# Patient Record
Sex: FEMALE | Race: WHITE | NOT HISPANIC OR LATINO | Employment: OTHER | URBAN - METROPOLITAN AREA
[De-identification: names, ages, dates, MRNs, and addresses within clinical notes are randomized per-mention and may not be internally consistent; named-entity substitution may affect disease eponyms.]

---

## 2023-09-21 ENCOUNTER — OFFICE VISIT (OUTPATIENT)
Dept: PRIMARY CARE | Facility: CLINIC | Age: 73
End: 2023-09-21
Payer: MEDICARE

## 2023-09-21 VITALS
OXYGEN SATURATION: 97 % | WEIGHT: 179.9 LBS | DIASTOLIC BLOOD PRESSURE: 80 MMHG | HEART RATE: 62 BPM | SYSTOLIC BLOOD PRESSURE: 142 MMHG | TEMPERATURE: 98 F | BODY MASS INDEX: 29.94 KG/M2

## 2023-09-21 DIAGNOSIS — Z11.59 NEED FOR HEPATITIS C SCREENING TEST: ICD-10-CM

## 2023-09-21 DIAGNOSIS — F41.1 GENERALIZED ANXIETY DISORDER: ICD-10-CM

## 2023-09-21 DIAGNOSIS — I10 BENIGN ESSENTIAL HYPERTENSION: ICD-10-CM

## 2023-09-21 DIAGNOSIS — Z12.31 ENCOUNTER FOR SCREENING MAMMOGRAM FOR BREAST CANCER: ICD-10-CM

## 2023-09-21 DIAGNOSIS — E78.2 MIXED HYPERLIPIDEMIA: ICD-10-CM

## 2023-09-21 DIAGNOSIS — Z23 NEED FOR VACCINATION: ICD-10-CM

## 2023-09-21 DIAGNOSIS — Z00.00 ROUTINE GENERAL MEDICAL EXAMINATION AT HEALTH CARE FACILITY: Primary | ICD-10-CM

## 2023-09-21 DIAGNOSIS — K21.9 GASTROESOPHAGEAL REFLUX DISEASE WITHOUT ESOPHAGITIS: ICD-10-CM

## 2023-09-21 DIAGNOSIS — I25.10 ATHEROSCLEROSIS OF NATIVE CORONARY ARTERY OF NATIVE HEART WITHOUT ANGINA PECTORIS: ICD-10-CM

## 2023-09-21 DIAGNOSIS — Z78.0 ASYMPTOMATIC MENOPAUSAL STATE: ICD-10-CM

## 2023-09-21 PROBLEM — E78.5 HYPERLIPIDEMIA: Status: ACTIVE | Noted: 2023-09-21

## 2023-09-21 PROCEDURE — G0008 ADMIN INFLUENZA VIRUS VAC: HCPCS | Performed by: INTERNAL MEDICINE

## 2023-09-21 PROCEDURE — G0439 PPPS, SUBSEQ VISIT: HCPCS | Performed by: INTERNAL MEDICINE

## 2023-09-21 PROCEDURE — 3079F DIAST BP 80-89 MM HG: CPT | Performed by: INTERNAL MEDICINE

## 2023-09-21 PROCEDURE — 1170F FXNL STATUS ASSESSED: CPT | Performed by: INTERNAL MEDICINE

## 2023-09-21 PROCEDURE — 1036F TOBACCO NON-USER: CPT | Performed by: INTERNAL MEDICINE

## 2023-09-21 PROCEDURE — 1160F RVW MEDS BY RX/DR IN RCRD: CPT | Performed by: INTERNAL MEDICINE

## 2023-09-21 PROCEDURE — 90662 IIV NO PRSV INCREASED AG IM: CPT | Performed by: INTERNAL MEDICINE

## 2023-09-21 PROCEDURE — 1159F MED LIST DOCD IN RCRD: CPT | Performed by: INTERNAL MEDICINE

## 2023-09-21 PROCEDURE — 99214 OFFICE O/P EST MOD 30 MIN: CPT | Performed by: INTERNAL MEDICINE

## 2023-09-21 PROCEDURE — 3077F SYST BP >= 140 MM HG: CPT | Performed by: INTERNAL MEDICINE

## 2023-09-21 RX ORDER — IBUPROFEN 100 MG/5ML
1 SUSPENSION, ORAL (FINAL DOSE FORM) ORAL DAILY
COMMUNITY
Start: 2014-06-27

## 2023-09-21 RX ORDER — BIOTIN 10 MG
1 TABLET ORAL DAILY
COMMUNITY
Start: 2017-01-27

## 2023-09-21 RX ORDER — ZINC SULFATE 50(220)MG
CAPSULE ORAL
COMMUNITY

## 2023-09-21 RX ORDER — BUSPIRONE HYDROCHLORIDE 15 MG/1
15 TABLET ORAL EVERY 12 HOURS
COMMUNITY
End: 2024-01-02

## 2023-09-21 RX ORDER — VIT C/E/ZN/COPPR/LUTEIN/ZEAXAN 250MG-90MG
25 CAPSULE ORAL
COMMUNITY

## 2023-09-21 RX ORDER — ZINC GLUCONATE 50 MG
TABLET ORAL
COMMUNITY
Start: 2017-09-26

## 2023-09-21 RX ORDER — NEOMYCIN SULFATE, POLYMYXIN B SULFATE, AND DEXAMETHASONE 3.5; 10000; 1 MG/G; [USP'U]/G; MG/G
OINTMENT OPHTHALMIC
COMMUNITY
Start: 2021-08-31

## 2023-09-21 RX ORDER — ROSUVASTATIN CALCIUM 10 MG/1
10 TABLET, COATED ORAL DAILY
COMMUNITY
Start: 2017-03-07 | End: 2024-03-27

## 2023-09-21 RX ORDER — LUTEIN 6 MG
1 TABLET ORAL DAILY
COMMUNITY
Start: 2017-01-27

## 2023-09-21 RX ORDER — ZOLPIDEM TARTRATE 10 MG/1
10 TABLET ORAL
COMMUNITY
Start: 2019-03-13 | End: 2023-10-10 | Stop reason: SDUPTHER

## 2023-09-21 RX ORDER — FLUTICASONE PROPIONATE 50 MCG
SPRAY, SUSPENSION (ML) NASAL
COMMUNITY
Start: 2023-09-18 | End: 2023-10-17 | Stop reason: ALTCHOICE

## 2023-09-21 RX ORDER — ROYAL JELLY 500 MG
CAPSULE ORAL 2 TIMES DAILY
COMMUNITY
Start: 2017-09-11

## 2023-09-21 RX ORDER — PANTOPRAZOLE SODIUM 40 MG/1
40 TABLET, DELAYED RELEASE ORAL
COMMUNITY
Start: 2016-10-21 | End: 2023-10-31 | Stop reason: SDUPTHER

## 2023-09-21 RX ORDER — LOSARTAN POTASSIUM 50 MG/1
50 TABLET ORAL DAILY
COMMUNITY
Start: 2014-06-27 | End: 2024-03-18

## 2023-09-21 ASSESSMENT — ACTIVITIES OF DAILY LIVING (ADL)
TAKING_MEDICATION: INDEPENDENT
GROCERY_SHOPPING: INDEPENDENT
DOING_HOUSEWORK: INDEPENDENT
DRESSING: INDEPENDENT
BATHING: INDEPENDENT
MANAGING_FINANCES: INDEPENDENT

## 2023-09-21 ASSESSMENT — PATIENT HEALTH QUESTIONNAIRE - PHQ9
2. FEELING DOWN, DEPRESSED OR HOPELESS: NOT AT ALL
1. LITTLE INTEREST OR PLEASURE IN DOING THINGS: NOT AT ALL
SUM OF ALL RESPONSES TO PHQ9 QUESTIONS 1 AND 2: 0

## 2023-09-21 NOTE — ASSESSMENT & PLAN NOTE
Blood pressure is not at goal.  She will monitor at home before medication changes.  DASH diet to be continued.  Follow-up in 4 weeks

## 2023-09-21 NOTE — PROGRESS NOTES
Subjective   Reason for Visit: Sruthi Sanders is an 72 y.o. female here for a Medicare Wellness visit.          Reviewed all medications by prescribing practitioner or clinical pharmacist (such as prescriptions, OTCs, herbal therapies and supplements) and documented in the medical record.    HPI    Patient Care Team:  Gretchen Morales MD as PCP - General  Gretchen Morales MD as PCP - [employer] PCP     Review of Systems    Objective   Vitals:  /80   Pulse 62   Temp 36.7 °C (98 °F)   Wt 81.6 kg (179 lb 14.4 oz)   SpO2 97%   BMI 29.94 kg/m²       Physical Exam    Assessment/Plan   Problem List Items Addressed This Visit       Benign essential hypertension    Current Assessment & Plan     Blood pressure is not at goal.  She will monitor at home before medication changes.  DASH diet to be continued.  Follow-up in 4 weeks         Coronary atherosclerosis - Primary    Current Assessment & Plan     Cardiac status is stable.  Continue medical management         Relevant Orders    CBC and Auto Differential    Comprehensive Metabolic Panel    Lipid Panel    Generalized anxiety disorder    Current Assessment & Plan     Anxiety symptoms fairly controlled.  Some days need additional half dose         GERD (gastroesophageal reflux disease)    Current Assessment & Plan     GERD controlled with PPI 3 times a week.  Continue same plan         Hyperlipidemia    Current Assessment & Plan     Check lipids and liver enzymes today.  Continue diet control and medication

## 2023-09-21 NOTE — PROGRESS NOTES
Subjective   Reason for Visit: Sruthi Sanders is an 72 y.o. female here for a Medicare Wellness visit.   And follow up  Taking  blood pressure medications.  Denies headache, dizziness, chest pain, shortness of breath or palpitation.  No exertional chest pain or dizziness or palpitation.  Has had eye exam within 1 year.  No blurred vision or double vision  Exercising  Following DASH diet      No angina  Edema stable  Some discoloration lower leg left  Anxiety controlled.some times need additional half elen  Gerd controlled  Needs ambien to sleep.no issues    Reviewed all medications by prescribing practitioner or clinical pharmacist (such as prescriptions, OTCs, herbal therapies and supplements) and documented in the medical record.    HPI    Patient Care Team:  Gretchen Morales MD as PCP - General  Gretchen Morales MD as PCP - [employer] PCP     Review of Systems    Objective   Vitals:  /80   Pulse 62   Temp 36.7 °C (98 °F)   Wt 81.6 kg (179 lb 14.4 oz)   SpO2 97%   BMI 29.94 kg/m²       Physical Exam  Constitutional:       Appearance: Normal appearance.   HENT:      Head: Normocephalic.   Eyes:      General: No scleral icterus.     Conjunctiva/sclera: Conjunctivae normal.   Cardiovascular:      Rate and Rhythm: Normal rate and regular rhythm.      Pulses: Normal pulses.      Heart sounds: No murmur heard.  Pulmonary:      Effort: Pulmonary effort is normal.      Breath sounds: Normal breath sounds.   Abdominal:      General: There is no distension.      Palpations: There is no mass.      Tenderness: There is no abdominal tenderness.   Neurological:      Mental Status: She is alert.   Psychiatric:         Mood and Affect: Mood normal.         Assessment/Plan   Problem List Items Addressed This Visit       Benign essential hypertension    Current Assessment & Plan     Blood pressure is not at goal.  She will monitor at home before medication changes.  DASH diet to be continued.  Follow-up  in 4 weeks         Coronary atherosclerosis    Current Assessment & Plan     Cardiac status is stable.  Continue medical management         Relevant Orders    CBC and Auto Differential    Comprehensive Metabolic Panel    Lipid Panel    Generalized anxiety disorder    Current Assessment & Plan     Anxiety symptoms fairly controlled.  Some days need additional half dose         GERD (gastroesophageal reflux disease)    Current Assessment & Plan     GERD controlled with PPI 3 times a week.  Continue same plan         Hyperlipidemia    Current Assessment & Plan     Check lipids and liver enzymes today.  Continue diet control and medication          Other Visit Diagnoses       Routine general medical examination at health care facility    -  Primary    Need for vaccination        Relevant Orders    Flu vaccine, high dose seasonal, preservative free    Asymptomatic menopausal state        Relevant Orders    XR DEXA bone density    Encounter for screening mammogram for breast cancer        Relevant Orders    BI mammo bilateral screening tomosynthesis    Need for hepatitis C screening test        Relevant Orders    Hepatitis C antibody

## 2023-10-10 ENCOUNTER — TELEPHONE (OUTPATIENT)
Dept: PRIMARY CARE | Facility: CLINIC | Age: 73
End: 2023-10-10

## 2023-10-10 DIAGNOSIS — F51.01 PRIMARY INSOMNIA: Primary | ICD-10-CM

## 2023-10-10 PROBLEM — G47.00 INSOMNIA: Status: ACTIVE | Noted: 2023-10-10

## 2023-10-10 RX ORDER — ZOLPIDEM TARTRATE 10 MG/1
10 TABLET ORAL NIGHTLY PRN
Qty: 90 TABLET | Refills: 0 | Status: SHIPPED | OUTPATIENT
Start: 2023-10-10 | End: 2024-01-02

## 2023-10-10 NOTE — TELEPHONE ENCOUNTER
Rx Refill Request Telephone Encounter    Name:  Sruthi Sanders  :  [id number]  Medication Name:  Zolpidem 10 mg tablet   Dose :    Route : oral  Quantity : 90   Directions :    Specific Pharmacy location:  [facility]   Date of last appointment:  23  Date of next appointment:  10/17/23  Best number to reach patient:

## 2023-10-15 PROBLEM — K43.9 VENTRAL HERNIA: Status: ACTIVE | Noted: 2023-10-15

## 2023-10-15 PROBLEM — I83.90 VARICOSE VEIN OF LEG: Status: ACTIVE | Noted: 2023-10-15

## 2023-10-15 PROBLEM — R94.31 ABNORMAL EKG: Status: ACTIVE | Noted: 2023-10-15

## 2023-10-15 PROBLEM — H01.009 BLEPHARITIS: Status: ACTIVE | Noted: 2023-10-15

## 2023-10-15 PROBLEM — N95.1 MENOPAUSAL SYNDROME (HOT FLASHES): Status: ACTIVE | Noted: 2023-10-15

## 2023-10-15 PROBLEM — M19.90 OSTEOARTHRITIS: Status: ACTIVE | Noted: 2023-10-15

## 2023-10-15 PROBLEM — I83.893 VARICOSE VEINS OF BILATERAL LOWER EXTREMITIES WITH OTHER COMPLICATIONS: Status: ACTIVE | Noted: 2023-10-15

## 2023-10-15 PROBLEM — Z79.899 MEDICATION MANAGEMENT: Status: ACTIVE | Noted: 2023-10-15

## 2023-10-15 RX ORDER — NAPROXEN SODIUM 220 MG/1
81 TABLET, FILM COATED ORAL DAILY
COMMUNITY

## 2023-10-17 ENCOUNTER — LAB (OUTPATIENT)
Dept: LAB | Facility: LAB | Age: 73
End: 2023-10-17
Payer: MEDICARE

## 2023-10-17 ENCOUNTER — OFFICE VISIT (OUTPATIENT)
Dept: PRIMARY CARE | Facility: CLINIC | Age: 73
End: 2023-10-17
Payer: MEDICARE

## 2023-10-17 VITALS
OXYGEN SATURATION: 98 % | WEIGHT: 181.6 LBS | TEMPERATURE: 97.2 F | DIASTOLIC BLOOD PRESSURE: 81 MMHG | SYSTOLIC BLOOD PRESSURE: 137 MMHG | HEART RATE: 57 BPM | BODY MASS INDEX: 30.22 KG/M2

## 2023-10-17 DIAGNOSIS — I10 BENIGN ESSENTIAL HYPERTENSION: Primary | ICD-10-CM

## 2023-10-17 DIAGNOSIS — Z11.59 NEED FOR HEPATITIS C SCREENING TEST: ICD-10-CM

## 2023-10-17 DIAGNOSIS — F41.1 GENERALIZED ANXIETY DISORDER: ICD-10-CM

## 2023-10-17 DIAGNOSIS — I25.10 ATHEROSCLEROSIS OF NATIVE CORONARY ARTERY OF NATIVE HEART WITHOUT ANGINA PECTORIS: ICD-10-CM

## 2023-10-17 LAB
ALBUMIN SERPL BCP-MCNC: 4 G/DL (ref 3.4–5)
ALP SERPL-CCNC: 55 U/L (ref 33–136)
ALT SERPL W P-5'-P-CCNC: 18 U/L (ref 7–45)
ANION GAP SERPL CALC-SCNC: 11 MMOL/L (ref 10–20)
AST SERPL W P-5'-P-CCNC: 21 U/L (ref 9–39)
BASOPHILS # BLD AUTO: 0.06 X10*3/UL (ref 0–0.1)
BASOPHILS NFR BLD AUTO: 1.1 %
BILIRUB SERPL-MCNC: 0.5 MG/DL (ref 0–1.2)
BUN SERPL-MCNC: 15 MG/DL (ref 6–23)
CALCIUM SERPL-MCNC: 9.5 MG/DL (ref 8.6–10.6)
CHLORIDE SERPL-SCNC: 102 MMOL/L (ref 98–107)
CHOLEST SERPL-MCNC: 136 MG/DL (ref 0–199)
CHOLESTEROL/HDL RATIO: 1.9
CO2 SERPL-SCNC: 30 MMOL/L (ref 21–32)
CREAT SERPL-MCNC: 0.84 MG/DL (ref 0.5–1.05)
EOSINOPHIL # BLD AUTO: 0.32 X10*3/UL (ref 0–0.4)
EOSINOPHIL NFR BLD AUTO: 5.6 %
ERYTHROCYTE [DISTWIDTH] IN BLOOD BY AUTOMATED COUNT: 14.1 % (ref 11.5–14.5)
GFR SERPL CREATININE-BSD FRML MDRD: 73 ML/MIN/1.73M*2
GLUCOSE SERPL-MCNC: 89 MG/DL (ref 74–99)
HCT VFR BLD AUTO: 39.2 % (ref 36–46)
HCV AB SER QL: NONREACTIVE
HDLC SERPL-MCNC: 70.7 MG/DL
HGB BLD-MCNC: 12.3 G/DL (ref 12–16)
IMM GRANULOCYTES # BLD AUTO: 0.01 X10*3/UL (ref 0–0.5)
IMM GRANULOCYTES NFR BLD AUTO: 0.2 % (ref 0–0.9)
LDLC SERPL CALC-MCNC: 51 MG/DL
LYMPHOCYTES # BLD AUTO: 1.39 X10*3/UL (ref 0.8–3)
LYMPHOCYTES NFR BLD AUTO: 24.4 %
MCH RBC QN AUTO: 30.7 PG (ref 26–34)
MCHC RBC AUTO-ENTMCNC: 31.4 G/DL (ref 32–36)
MCV RBC AUTO: 98 FL (ref 80–100)
MONOCYTES # BLD AUTO: 0.43 X10*3/UL (ref 0.05–0.8)
MONOCYTES NFR BLD AUTO: 7.6 %
NEUTROPHILS # BLD AUTO: 3.48 X10*3/UL (ref 1.6–5.5)
NEUTROPHILS NFR BLD AUTO: 61.1 %
NON HDL CHOLESTEROL: 65 MG/DL (ref 0–149)
NRBC BLD-RTO: 0 /100 WBCS (ref 0–0)
PLATELET # BLD AUTO: 228 X10*3/UL (ref 150–450)
PMV BLD AUTO: 11 FL (ref 7.5–11.5)
POTASSIUM SERPL-SCNC: 4.4 MMOL/L (ref 3.5–5.3)
PROT SERPL-MCNC: 6.3 G/DL (ref 6.4–8.2)
RBC # BLD AUTO: 4.01 X10*6/UL (ref 4–5.2)
SODIUM SERPL-SCNC: 139 MMOL/L (ref 136–145)
TRIGL SERPL-MCNC: 71 MG/DL (ref 0–149)
VLDL: 14 MG/DL (ref 0–40)
WBC # BLD AUTO: 5.7 X10*3/UL (ref 4.4–11.3)

## 2023-10-17 PROCEDURE — 80053 COMPREHEN METABOLIC PANEL: CPT

## 2023-10-17 PROCEDURE — 85025 COMPLETE CBC W/AUTO DIFF WBC: CPT

## 2023-10-17 PROCEDURE — 86803 HEPATITIS C AB TEST: CPT

## 2023-10-17 PROCEDURE — 1160F RVW MEDS BY RX/DR IN RCRD: CPT | Performed by: INTERNAL MEDICINE

## 2023-10-17 PROCEDURE — 80061 LIPID PANEL: CPT

## 2023-10-17 PROCEDURE — 99213 OFFICE O/P EST LOW 20 MIN: CPT | Performed by: INTERNAL MEDICINE

## 2023-10-17 PROCEDURE — 3079F DIAST BP 80-89 MM HG: CPT | Performed by: INTERNAL MEDICINE

## 2023-10-17 PROCEDURE — 1036F TOBACCO NON-USER: CPT | Performed by: INTERNAL MEDICINE

## 2023-10-17 PROCEDURE — 3075F SYST BP GE 130 - 139MM HG: CPT | Performed by: INTERNAL MEDICINE

## 2023-10-17 PROCEDURE — 1159F MED LIST DOCD IN RCRD: CPT | Performed by: INTERNAL MEDICINE

## 2023-10-17 PROCEDURE — 36415 COLL VENOUS BLD VENIPUNCTURE: CPT

## 2023-10-17 RX ORDER — HYDROCHLOROTHIAZIDE 12.5 MG/1
12.5 TABLET ORAL DAILY
Qty: 30 TABLET | Refills: 5 | Status: SHIPPED | OUTPATIENT
Start: 2023-10-17 | End: 2024-04-16 | Stop reason: SINTOL

## 2023-10-17 ASSESSMENT — PATIENT HEALTH QUESTIONNAIRE - PHQ9
1. LITTLE INTEREST OR PLEASURE IN DOING THINGS: NOT AT ALL
SUM OF ALL RESPONSES TO PHQ9 QUESTIONS 1 AND 2: 0
2. FEELING DOWN, DEPRESSED OR HOPELESS: NOT AT ALL

## 2023-10-17 NOTE — PROGRESS NOTES
Subjective   Patient ID: Sruthi Sanders is a 72 y.o. female who presents for Follow-up (BP check.).    HPI   Patient is here to follow-up on blood pressure.  Some days are higher and some days normal.  Started using  compression socks and edema is less  Anxiety is not well controlled on current treatment of BuSpar and she is trying 40 mg daily.  Feeling better  May need a refill earlier  Review of Systems  No headache or dizziness  No chest pain or shortness of breath  No PND or orthopnea  Objective   /81   Pulse 57   Temp 36.2 °C (97.2 °F)   Wt 82.4 kg (181 lb 9.6 oz)   SpO2 98%   BMI 30.22 kg/m²     Physical Exam  In NAD  No pallor or icterus  Neck supple  Chest is clear  CVS: S1-S2 regular rate and rhythm no murmur  Extremities trace edema on left  Mood and affect normal  Assessment/Plan   Problem List Items Addressed This Visit             ICD-10-CM    Benign essential hypertension - Primary I10    Relevant Medications    hydroCHLOROthiazide (HYDRODiuril) 12.5 mg tablet    Generalized anxiety disorder F41.1     Since BP is sometimes high and patient having lower extremity edema we will start low-dose diuretic.  Continue compression socks  Continue losartan    Continue BuSpar at 40 mg.  Call me for refills

## 2023-10-19 ENCOUNTER — APPOINTMENT (OUTPATIENT)
Dept: PRIMARY CARE | Facility: CLINIC | Age: 73
End: 2023-10-19
Payer: MEDICARE

## 2023-10-31 DIAGNOSIS — K21.9 GASTROESOPHAGEAL REFLUX DISEASE WITHOUT ESOPHAGITIS: Primary | ICD-10-CM

## 2023-10-31 RX ORDER — PANTOPRAZOLE SODIUM 40 MG/1
TABLET, DELAYED RELEASE ORAL
Qty: 36 TABLET | Refills: 3 | Status: SHIPPED | OUTPATIENT
Start: 2023-10-31

## 2024-04-01 DIAGNOSIS — F51.01 PRIMARY INSOMNIA: ICD-10-CM

## 2024-04-01 DIAGNOSIS — F41.1 GENERALIZED ANXIETY DISORDER: ICD-10-CM

## 2024-04-01 RX ORDER — ZOLPIDEM TARTRATE 10 MG/1
10 TABLET ORAL NIGHTLY PRN
Qty: 90 TABLET | Refills: 1 | Status: SHIPPED | OUTPATIENT
Start: 2024-04-01 | End: 2024-09-28

## 2024-04-01 RX ORDER — BUSPIRONE HYDROCHLORIDE 15 MG/1
15 TABLET ORAL EVERY 12 HOURS
Qty: 180 TABLET | Refills: 1 | Status: SHIPPED | OUTPATIENT
Start: 2024-04-01

## 2024-04-16 ENCOUNTER — TELEPHONE (OUTPATIENT)
Dept: PRIMARY CARE | Facility: CLINIC | Age: 74
End: 2024-04-16

## 2024-04-16 ENCOUNTER — OFFICE VISIT (OUTPATIENT)
Dept: PRIMARY CARE | Facility: CLINIC | Age: 74
End: 2024-04-16
Payer: MEDICARE

## 2024-04-16 VITALS
HEART RATE: 64 BPM | BODY MASS INDEX: 30.47 KG/M2 | TEMPERATURE: 97.6 F | DIASTOLIC BLOOD PRESSURE: 73 MMHG | OXYGEN SATURATION: 98 % | WEIGHT: 182.9 LBS | HEIGHT: 65 IN | SYSTOLIC BLOOD PRESSURE: 119 MMHG

## 2024-04-16 DIAGNOSIS — E78.2 MIXED HYPERLIPIDEMIA: ICD-10-CM

## 2024-04-16 DIAGNOSIS — I10 BENIGN ESSENTIAL HYPERTENSION: Primary | ICD-10-CM

## 2024-04-16 DIAGNOSIS — F41.1 GENERALIZED ANXIETY DISORDER: ICD-10-CM

## 2024-04-16 DIAGNOSIS — F51.01 PRIMARY INSOMNIA: ICD-10-CM

## 2024-04-16 DIAGNOSIS — K21.9 GASTROESOPHAGEAL REFLUX DISEASE WITHOUT ESOPHAGITIS: ICD-10-CM

## 2024-04-16 PROCEDURE — 1158F ADVNC CARE PLAN TLK DOCD: CPT | Performed by: INTERNAL MEDICINE

## 2024-04-16 PROCEDURE — 1159F MED LIST DOCD IN RCRD: CPT | Performed by: INTERNAL MEDICINE

## 2024-04-16 PROCEDURE — 1123F ACP DISCUSS/DSCN MKR DOCD: CPT | Performed by: INTERNAL MEDICINE

## 2024-04-16 PROCEDURE — 1036F TOBACCO NON-USER: CPT | Performed by: INTERNAL MEDICINE

## 2024-04-16 PROCEDURE — 3074F SYST BP LT 130 MM HG: CPT | Performed by: INTERNAL MEDICINE

## 2024-04-16 PROCEDURE — 99213 OFFICE O/P EST LOW 20 MIN: CPT | Performed by: INTERNAL MEDICINE

## 2024-04-16 PROCEDURE — 1160F RVW MEDS BY RX/DR IN RCRD: CPT | Performed by: INTERNAL MEDICINE

## 2024-04-16 PROCEDURE — G2211 COMPLEX E/M VISIT ADD ON: HCPCS | Performed by: INTERNAL MEDICINE

## 2024-04-16 PROCEDURE — 3078F DIAST BP <80 MM HG: CPT | Performed by: INTERNAL MEDICINE

## 2024-04-16 ASSESSMENT — ENCOUNTER SYMPTOMS
SHORTNESS OF BREATH: 0
ORTHOPNEA: 0
PND: 0
HYPERTENSION: 1
SWEATS: 0
BLURRED VISION: 0
HEADACHES: 0
PALPITATIONS: 0

## 2024-04-16 ASSESSMENT — PATIENT HEALTH QUESTIONNAIRE - PHQ9
1. LITTLE INTEREST OR PLEASURE IN DOING THINGS: NOT AT ALL
2. FEELING DOWN, DEPRESSED OR HOPELESS: NOT AT ALL
SUM OF ALL RESPONSES TO PHQ9 QUESTIONS 1 AND 2: 0

## 2024-04-16 NOTE — TELEPHONE ENCOUNTER
Pt was seen today. She scheduled her next visit in October, so if you want to put in labs now,  she will go get them a few days before hand.

## 2024-04-16 NOTE — PROGRESS NOTES
"Subjective   Patient ID: Sruthi Sanders is a 73 y.o. female who presents for Follow-up.    Hypertension  This is a chronic problem. The current episode started more than 1 year ago. The problem has been resolved since onset. The problem is controlled. Pertinent negatives include no anxiety, blurred vision, chest pain, headaches, malaise/fatigue, orthopnea, palpitations, peripheral edema, PND, shortness of breath or sweats. There are no associated agents to hypertension. Risk factors for coronary artery disease include family history, obesity, post-menopausal state and stress.      Taking  blood pressure medications.  Denies headache, dizziness, chest pain, shortness of breath or palpitation.  No exertional chest pain or dizziness or palpitation.  Has had eye exam within 1 year.  No blurred vision or double vision  Exercising now  Following DASH diet   No angina  Edema stable  Some discoloration lower leg left    Anxiety controlled.some times need additional half elen  Gerd controlled  Needs ambien to sleep.no issues.takes it daily  Had mammo and dexa    Tolerates statin.no myalgias.  Review of Systems   Constitutional:  Negative for malaise/fatigue.   Eyes:  Negative for blurred vision.   Respiratory:  Negative for shortness of breath.    Cardiovascular:  Negative for chest pain, palpitations, orthopnea and PND.   Neurological:  Negative for headaches.     Constitutional: not feeling poorly, no fever and not feeling tired.   ENT: no swollen glands in the neck.   Cardiovascular: no chest pain, no shortness of breath, no palpitations and no lower extremity edema.   Respiratory: no cough.   Gastrointestinal: no constipation and no nausea.      Objective   /73   Pulse 64   Temp 36.4 °C (97.6 °F)   Ht 1.651 m (5' 5\")   Wt 83 kg (182 lb 14.4 oz)   SpO2 98%   BMI 30.44 kg/m²     Physical Exam  In NAD  No pallor or icterus  Neck supple  Chest is clear  CVS: S1-S2 regular rate and rhythm no murmur  Extremities " trace edema on left  Mood and affect normal  Assessment/Plan   Problem List Items Addressed This Visit             ICD-10-CM    Benign essential hypertension - Primary I10    Generalized anxiety disorder F41.1    Hyperlipidemia E78.5    Insomnia G47.00        Blood pressure is controlled.  Same treatment  Anxiety controlled on current treatment  Continue statin  Continue Ambien  Has signed controlled substance agreement continue Protonix 3 times a week  Continue calcium and D and exercise

## 2024-06-03 DIAGNOSIS — E78.2 MIXED HYPERLIPIDEMIA: ICD-10-CM

## 2024-06-03 DIAGNOSIS — I10 BENIGN ESSENTIAL HYPERTENSION: ICD-10-CM

## 2024-06-04 RX ORDER — ROSUVASTATIN CALCIUM 10 MG/1
10 TABLET, COATED ORAL DAILY
Qty: 90 TABLET | Refills: 3 | Status: SHIPPED | OUTPATIENT
Start: 2024-06-04

## 2024-06-04 RX ORDER — LOSARTAN POTASSIUM 50 MG/1
50 TABLET ORAL DAILY
Qty: 90 TABLET | Refills: 3 | Status: SHIPPED | OUTPATIENT
Start: 2024-06-04

## 2024-08-28 DIAGNOSIS — F41.1 GENERALIZED ANXIETY DISORDER: ICD-10-CM

## 2024-08-28 RX ORDER — BUSPIRONE HYDROCHLORIDE 15 MG/1
15 TABLET ORAL EVERY 12 HOURS
Qty: 180 TABLET | Refills: 3 | Status: SHIPPED | OUTPATIENT
Start: 2024-08-28

## 2024-10-15 ENCOUNTER — APPOINTMENT (OUTPATIENT)
Dept: PRIMARY CARE | Facility: CLINIC | Age: 74
End: 2024-10-15
Payer: MEDICARE

## 2024-10-18 DIAGNOSIS — E78.2 MIXED HYPERLIPIDEMIA: ICD-10-CM

## 2024-10-18 DIAGNOSIS — I10 BENIGN ESSENTIAL HYPERTENSION: Primary | ICD-10-CM

## 2024-10-21 ENCOUNTER — APPOINTMENT (OUTPATIENT)
Dept: PRIMARY CARE | Facility: CLINIC | Age: 74
End: 2024-10-21
Payer: MEDICARE

## 2024-10-21 VITALS
WEIGHT: 175 LBS | SYSTOLIC BLOOD PRESSURE: 134 MMHG | HEART RATE: 51 BPM | TEMPERATURE: 97.8 F | BODY MASS INDEX: 29.16 KG/M2 | HEIGHT: 65 IN | OXYGEN SATURATION: 98 % | DIASTOLIC BLOOD PRESSURE: 80 MMHG

## 2024-10-21 DIAGNOSIS — F51.01 PRIMARY INSOMNIA: ICD-10-CM

## 2024-10-21 DIAGNOSIS — Z12.31 ENCOUNTER FOR SCREENING MAMMOGRAM FOR BREAST CANCER: ICD-10-CM

## 2024-10-21 DIAGNOSIS — I10 BENIGN ESSENTIAL HYPERTENSION: ICD-10-CM

## 2024-10-21 DIAGNOSIS — Z00.00 ROUTINE GENERAL MEDICAL EXAMINATION AT HEALTH CARE FACILITY: Primary | ICD-10-CM

## 2024-10-21 DIAGNOSIS — H01.021 SQUAMOUS BLEPHARITIS OF UPPER EYELIDS OF BOTH EYES: ICD-10-CM

## 2024-10-21 DIAGNOSIS — F41.1 GENERALIZED ANXIETY DISORDER: ICD-10-CM

## 2024-10-21 DIAGNOSIS — H01.024 SQUAMOUS BLEPHARITIS OF UPPER EYELIDS OF BOTH EYES: ICD-10-CM

## 2024-10-21 DIAGNOSIS — E78.2 MIXED HYPERLIPIDEMIA: ICD-10-CM

## 2024-10-21 PROCEDURE — 3008F BODY MASS INDEX DOCD: CPT | Performed by: INTERNAL MEDICINE

## 2024-10-21 PROCEDURE — 99214 OFFICE O/P EST MOD 30 MIN: CPT | Performed by: INTERNAL MEDICINE

## 2024-10-21 PROCEDURE — 1123F ACP DISCUSS/DSCN MKR DOCD: CPT | Performed by: INTERNAL MEDICINE

## 2024-10-21 PROCEDURE — 1158F ADVNC CARE PLAN TLK DOCD: CPT | Performed by: INTERNAL MEDICINE

## 2024-10-21 PROCEDURE — G0439 PPPS, SUBSEQ VISIT: HCPCS | Performed by: INTERNAL MEDICINE

## 2024-10-21 PROCEDURE — 1159F MED LIST DOCD IN RCRD: CPT | Performed by: INTERNAL MEDICINE

## 2024-10-21 PROCEDURE — 1160F RVW MEDS BY RX/DR IN RCRD: CPT | Performed by: INTERNAL MEDICINE

## 2024-10-21 PROCEDURE — 3078F DIAST BP <80 MM HG: CPT | Performed by: INTERNAL MEDICINE

## 2024-10-21 PROCEDURE — 3075F SYST BP GE 130 - 139MM HG: CPT | Performed by: INTERNAL MEDICINE

## 2024-10-21 PROCEDURE — 1036F TOBACCO NON-USER: CPT | Performed by: INTERNAL MEDICINE

## 2024-10-21 PROCEDURE — 1170F FXNL STATUS ASSESSED: CPT | Performed by: INTERNAL MEDICINE

## 2024-10-21 RX ORDER — NEOMYCIN SULFATE, POLYMYXIN B SULFATE, AND DEXAMETHASONE 3.5; 10000; 1 MG/G; [USP'U]/G; MG/G
OINTMENT OPHTHALMIC EVERY 8 HOURS SCHEDULED
Qty: 3.5 G | Refills: 0 | Status: SHIPPED | OUTPATIENT
Start: 2024-10-21 | End: 2024-10-28

## 2024-10-21 ASSESSMENT — ENCOUNTER SYMPTOMS
DEPRESSION: 0
OCCASIONAL FEELINGS OF UNSTEADINESS: 0
LOSS OF SENSATION IN FEET: 0

## 2024-10-21 ASSESSMENT — ACTIVITIES OF DAILY LIVING (ADL)
MANAGING_FINANCES: INDEPENDENT
BATHING: INDEPENDENT
DOING_HOUSEWORK: INDEPENDENT
TAKING_MEDICATION: INDEPENDENT
GROCERY_SHOPPING: INDEPENDENT
DRESSING: INDEPENDENT

## 2024-10-21 ASSESSMENT — PATIENT HEALTH QUESTIONNAIRE - PHQ9
SUM OF ALL RESPONSES TO PHQ9 QUESTIONS 1 AND 2: 0
2. FEELING DOWN, DEPRESSED OR HOPELESS: NOT AT ALL
1. LITTLE INTEREST OR PLEASURE IN DOING THINGS: NOT AT ALL

## 2024-10-21 NOTE — PROGRESS NOTES
"Subjective   Reason for Visit: Sruthi Sanders is an 73 y.o. female here for a Medicare Wellness visit.     Past Medical, Surgical, and Family History reviewed and updated in chart.    Reviewed all medications by prescribing practitioner or clinical pharmacist (such as prescriptions, OTCs, herbal therapies and supplements) and documented in the medical record.    HPI  Taking  blood pressure medications.  Denies headache, dizziness, chest pain, shortness of breath or palpitation.  No exertional chest pain or dizziness or palpitation.  Has had eye exam within 1 year.  No blurred vision or double vision  Exercising now  Following DASH diet   No angina  Edema better.  Need podiatry referral.  Has deformity of toes on right side     Anxiety controlled.some times need additional half elen  Gerd controlled  Needs ambien to sleep.no issues.takes it daily    Tolerates statin.planning to eat better  Patient Care Team:  Gretchen Morales MD as PCP - General (Internal Medicine)  Gretchen Morales MD as PCP - [employer] PCP     Review of Systems  Constitutional: No recent weight loss, no fever or chills or night sweats.  No fatigue  HEENT: No blurred vision or double vision.  No hearing loss.  No sinus drainage or nosebleeds  CVS: No exertional chest pain or shortness of breath.  No palpitation or edema  Respiratory: No chronic cough or shortness of breath or wheezing  GI: No nausea vomiting or heartburn diarrhea or constipation.  No rectal bleed or bowel changes  Genitourinary: No urinary frequency or hesitancy.  No nocturia or blood in urine  Neuro: No weakness numbness or tingling.  No memory issues.  Psych: No anxiety or depression   Objective   Vitals:  /80   Pulse 51   Temp 36.6 °C (97.8 °F)   Ht 1.651 m (5' 5\")   Wt 79.4 kg (175 lb)   SpO2 98%   BMI 29.12 kg/m²       Physical Exam  In general, well-appearing, not in acute distress, alert and oriented.  HEENT: No pallor or icterus  Neck: No " lymphadenopathy, no stiffness.  Supple.  .No JVD  Chest: Clear to auscultation, good air entry.  CVS: S1 and S2 regular.  No murmur, no gallop, S3 or S4.  No peripheral edema.  No carotid bruit.  Abdomen: Soft, no tenderness, no hepatosplenomegaly.  Extremities: No calf tenderness.  No peripheral edema.  Has bunion and toe deformity right foot  Neuro: No focal deficits.  Psych: Mood and affect normal.  Good judgment and insight   Assessment & Plan  Routine general medical examination at health care facility    Orders:    1 Year Follow Up In Primary Care - Wellness Exam; Future  Wellness exam and counseling completed  Has good functional status  Recommended flu shot   is power of   See podiatrist for foot deformity  Encounter for screening mammogram for breast cancer    Orders:    BI mammo bilateral screening tomosynthesis; Future    Benign essential hypertension  Blood pressure is not under control.  Continue same treatment     Check labs  Mixed hyperlipidemia  Continue statin       Generalized anxiety disorder  Anxiety controlled on treatment       Squamous blepharitis of upper eyelids of both eyes    Orders:    neomycin-polymyxin B-dexameth (Polydex) 3.5 mg/g-10,000 unit/g-0.1 % ointment ophthalmic ointment; Apply to affected eye(s) every 8 hours for 7 days.    Primary insomnia         Continue Polydex  as needed for blepharitis

## 2024-10-21 NOTE — ASSESSMENT & PLAN NOTE
Orders:    1 Year Follow Up In Primary Care - Wellness Exam; Future  Wellness exam and counseling completed  Has good functional status  Recommended flu shot   is power of   See podiatrist for foot deformity

## 2024-10-21 NOTE — ASSESSMENT & PLAN NOTE
Orders:    neomycin-polymyxin B-dexameth (Polydex) 3.5 mg/g-10,000 unit/g-0.1 % ointment ophthalmic ointment; Apply to affected eye(s) every 8 hours for 7 days.

## 2024-10-22 ENCOUNTER — LAB (OUTPATIENT)
Dept: LAB | Facility: LAB | Age: 74
End: 2024-10-22
Payer: MEDICARE

## 2024-10-22 DIAGNOSIS — I10 BENIGN ESSENTIAL HYPERTENSION: ICD-10-CM

## 2024-10-22 DIAGNOSIS — E78.2 MIXED HYPERLIPIDEMIA: ICD-10-CM

## 2024-10-22 LAB
ALBUMIN SERPL BCP-MCNC: 4 G/DL (ref 3.4–5)
ALP SERPL-CCNC: 55 U/L (ref 33–136)
ALT SERPL W P-5'-P-CCNC: 17 U/L (ref 7–45)
ANION GAP SERPL CALC-SCNC: 9 MMOL/L (ref 10–20)
AST SERPL W P-5'-P-CCNC: 23 U/L (ref 9–39)
BASOPHILS # BLD AUTO: 0.06 X10*3/UL (ref 0–0.1)
BASOPHILS NFR BLD AUTO: 1.1 %
BILIRUB SERPL-MCNC: 0.7 MG/DL (ref 0–1.2)
BUN SERPL-MCNC: 13 MG/DL (ref 6–23)
CALCIUM SERPL-MCNC: 9.3 MG/DL (ref 8.6–10.6)
CHLORIDE SERPL-SCNC: 102 MMOL/L (ref 98–107)
CHOLEST SERPL-MCNC: 136 MG/DL (ref 0–199)
CHOLESTEROL/HDL RATIO: 2
CO2 SERPL-SCNC: 32 MMOL/L (ref 21–32)
CREAT SERPL-MCNC: 0.83 MG/DL (ref 0.5–1.05)
EGFRCR SERPLBLD CKD-EPI 2021: 74 ML/MIN/1.73M*2
EOSINOPHIL # BLD AUTO: 0.28 X10*3/UL (ref 0–0.4)
EOSINOPHIL NFR BLD AUTO: 5.1 %
ERYTHROCYTE [DISTWIDTH] IN BLOOD BY AUTOMATED COUNT: 13.9 % (ref 11.5–14.5)
GLUCOSE SERPL-MCNC: 102 MG/DL (ref 74–99)
HCT VFR BLD AUTO: 37.3 % (ref 36–46)
HDLC SERPL-MCNC: 68.1 MG/DL
HGB BLD-MCNC: 12.1 G/DL (ref 12–16)
IMM GRANULOCYTES # BLD AUTO: 0.01 X10*3/UL (ref 0–0.5)
IMM GRANULOCYTES NFR BLD AUTO: 0.2 % (ref 0–0.9)
LDLC SERPL CALC-MCNC: 52 MG/DL
LYMPHOCYTES # BLD AUTO: 1.44 X10*3/UL (ref 0.8–3)
LYMPHOCYTES NFR BLD AUTO: 26.4 %
MCH RBC QN AUTO: 31.3 PG (ref 26–34)
MCHC RBC AUTO-ENTMCNC: 32.4 G/DL (ref 32–36)
MCV RBC AUTO: 96 FL (ref 80–100)
MONOCYTES # BLD AUTO: 0.44 X10*3/UL (ref 0.05–0.8)
MONOCYTES NFR BLD AUTO: 8.1 %
NEUTROPHILS # BLD AUTO: 3.23 X10*3/UL (ref 1.6–5.5)
NEUTROPHILS NFR BLD AUTO: 59.1 %
NON HDL CHOLESTEROL: 68 MG/DL (ref 0–149)
NRBC BLD-RTO: 0 /100 WBCS (ref 0–0)
PLATELET # BLD AUTO: 212 X10*3/UL (ref 150–450)
POTASSIUM SERPL-SCNC: 4.2 MMOL/L (ref 3.5–5.3)
PROT SERPL-MCNC: 6.3 G/DL (ref 6.4–8.2)
RBC # BLD AUTO: 3.87 X10*6/UL (ref 4–5.2)
SODIUM SERPL-SCNC: 139 MMOL/L (ref 136–145)
TRIGL SERPL-MCNC: 78 MG/DL (ref 0–149)
VLDL: 16 MG/DL (ref 0–40)
WBC # BLD AUTO: 5.5 X10*3/UL (ref 4.4–11.3)

## 2024-10-22 PROCEDURE — 80061 LIPID PANEL: CPT

## 2024-10-22 PROCEDURE — 85025 COMPLETE CBC W/AUTO DIFF WBC: CPT

## 2024-10-22 PROCEDURE — 80053 COMPREHEN METABOLIC PANEL: CPT

## 2024-10-22 PROCEDURE — 36415 COLL VENOUS BLD VENIPUNCTURE: CPT

## 2024-10-24 DIAGNOSIS — K21.9 GASTROESOPHAGEAL REFLUX DISEASE WITHOUT ESOPHAGITIS: ICD-10-CM

## 2024-10-24 RX ORDER — PANTOPRAZOLE SODIUM 40 MG/1
TABLET, DELAYED RELEASE ORAL
Qty: 36 TABLET | Refills: 3 | Status: SHIPPED | OUTPATIENT
Start: 2024-10-24

## 2024-11-12 DIAGNOSIS — F51.01 PRIMARY INSOMNIA: ICD-10-CM

## 2024-11-12 RX ORDER — ZOLPIDEM TARTRATE 10 MG/1
10 TABLET ORAL NIGHTLY PRN
Qty: 90 TABLET | Refills: 1 | Status: SHIPPED | OUTPATIENT
Start: 2024-11-12 | End: 2025-05-11

## 2025-01-08 ENCOUNTER — HOSPITAL ENCOUNTER (OUTPATIENT)
Dept: RADIOLOGY | Facility: CLINIC | Age: 75
Discharge: HOME | End: 2025-01-08
Payer: MEDICARE

## 2025-01-08 ENCOUNTER — OFFICE VISIT (OUTPATIENT)
Dept: PODIATRY | Facility: CLINIC | Age: 75
End: 2025-01-08
Payer: MEDICARE

## 2025-01-08 DIAGNOSIS — M79.671 FOOT PAIN, RIGHT: ICD-10-CM

## 2025-01-08 DIAGNOSIS — R26.89 ANTALGIC GAIT: ICD-10-CM

## 2025-01-08 DIAGNOSIS — M21.621 TAILOR'S BUNION OF RIGHT FOOT: ICD-10-CM

## 2025-01-08 DIAGNOSIS — M21.41 SPLAYFOOT, ACQUIRED, RIGHT: ICD-10-CM

## 2025-01-08 DIAGNOSIS — M20.41 HAMMER TOE OF RIGHT FOOT: ICD-10-CM

## 2025-01-08 DIAGNOSIS — M20.41 HAMMER TOE OF RIGHT FOOT: Primary | ICD-10-CM

## 2025-01-08 PROCEDURE — 73630 X-RAY EXAM OF FOOT: CPT | Mod: RT

## 2025-01-08 PROCEDURE — 1123F ACP DISCUSS/DSCN MKR DOCD: CPT | Performed by: PODIATRIST

## 2025-01-08 PROCEDURE — 1159F MED LIST DOCD IN RCRD: CPT | Performed by: PODIATRIST

## 2025-01-08 PROCEDURE — 1036F TOBACCO NON-USER: CPT | Performed by: PODIATRIST

## 2025-01-08 PROCEDURE — 1160F RVW MEDS BY RX/DR IN RCRD: CPT | Performed by: PODIATRIST

## 2025-01-08 PROCEDURE — 99204 OFFICE O/P NEW MOD 45 MIN: CPT | Performed by: PODIATRIST

## 2025-01-08 PROCEDURE — 99214 OFFICE O/P EST MOD 30 MIN: CPT | Performed by: PODIATRIST

## 2025-01-08 NOTE — PROGRESS NOTES
Chief Complaint   Patient presents with    Hammer Toe    Bunions     Hammer toe right second toe.  Surgery  was done in 2025.  Dr. Raquel arechiga  also bunion right foot   Patient referred by primary care physician  Remote history of hammertoe surgery second digit right foot this was in December 2016.  States that was never right after surgery.  Toe is overlapping hallux at this time significantly.  Painful.  Antalgic gait.  Difficulty with shoe gear.  No constitutional symptoms.  She does bring up amputation of the digit.  Since last spring to summer further cross over the second digit and pain at the first MTP joint.  Nondiabetic.  Positive hypertension.  Anxiety.  Denies cardiovascular disease.  Medications and allergies reviewed.  Here with daughter Radha.   Remote smoker.  Retired speech therapist and .     Review of systems negative except as noted above.      General/Constitutional: Alert. NAD.   Respiratory: Non labored breathing.   Psychiatric: Mood and affect normal/baseline.   HEENT: Sclera clear.   Dermatologic: Skin and nails intact.  There is however a pressure area beneath the second MTP joint right foot.  No acute inflammatory infectious process. Web spaces are dry. No ulcers no pre-ulcerative areas.  Vascular: Pedal pulses are intact and symmetric including the dorsalis pedis and the posterior tibial pulses. Feet are warm to touch. No swelling appreciated either extremity.  Neurological: Alert and oriented. No acute distress. No sensory impairment bilateral.  Musculoskeletal: Strength is normal for age. No acute deficits appreciated.  Significant rigidly displaced second digit right foot it completely overlaps the hallux.  Proximal phalanx is displaced 90 degrees.  X-rays reviewed by myself demonstrates significant hallux valgus deformity of the right foot.  Splayfoot deformity.  Significant displaces hammertoe right foot.      -Today's treatment and course of therapy was discussed  with the patient in detail. Patient's questions were answered. Proper foot care was discussed. This dictation was done using Dragon computer software and as such may contain grammatical errors.    -Review x-ray findings in detail with patient and daughter.    -Lengthy discussion of surgical intervention including amputation of the second digit.  Discussed simple bunionectomy to alleviate the pressure.    -Patient will consider options.  She is agreeable to this.  Will follow-up next month to decide on a surgical time.  In the meantime comfortable shoes padding etc. as you have done before    -Review x-rays in detail.    -Treatment options discussed including surgical reduction which would be challenging given the displacement of the digit.  Amputation also discussed.    -Labs from 10/22/2024 reviewed.    -Medicine note 10/21/2024 reviewed.

## 2025-01-13 ENCOUNTER — APPOINTMENT (OUTPATIENT)
Dept: PODIATRY | Facility: CLINIC | Age: 75
End: 2025-01-13
Payer: MEDICARE

## 2025-02-10 ENCOUNTER — APPOINTMENT (OUTPATIENT)
Dept: PODIATRY | Facility: CLINIC | Age: 75
End: 2025-02-10
Payer: MEDICARE

## 2025-03-10 ENCOUNTER — APPOINTMENT (OUTPATIENT)
Dept: PODIATRY | Facility: CLINIC | Age: 75
End: 2025-03-10
Payer: MEDICARE

## 2025-03-10 DIAGNOSIS — M21.621 TAILOR'S BUNION OF RIGHT FOOT: ICD-10-CM

## 2025-03-10 DIAGNOSIS — M20.11 HALLUX VALGUS, RIGHT: ICD-10-CM

## 2025-03-10 DIAGNOSIS — M20.41 HAMMER TOE OF RIGHT FOOT: Primary | ICD-10-CM

## 2025-03-10 DIAGNOSIS — M79.671 FOOT PAIN, RIGHT: ICD-10-CM

## 2025-03-10 DIAGNOSIS — M21.41 SPLAYFOOT, ACQUIRED, RIGHT: ICD-10-CM

## 2025-03-10 DIAGNOSIS — R26.89 ANTALGIC GAIT: ICD-10-CM

## 2025-03-10 DIAGNOSIS — Z01.818 PREOP EXAMINATION: ICD-10-CM

## 2025-03-10 PROCEDURE — 1036F TOBACCO NON-USER: CPT | Performed by: PODIATRIST

## 2025-03-10 PROCEDURE — 1159F MED LIST DOCD IN RCRD: CPT | Performed by: PODIATRIST

## 2025-03-10 PROCEDURE — 99215 OFFICE O/P EST HI 40 MIN: CPT | Performed by: PODIATRIST

## 2025-03-10 PROCEDURE — 1123F ACP DISCUSS/DSCN MKR DOCD: CPT | Performed by: PODIATRIST

## 2025-03-10 PROCEDURE — 1160F RVW MEDS BY RX/DR IN RCRD: CPT | Performed by: PODIATRIST

## 2025-03-10 RX ORDER — CEFAZOLIN SODIUM 2 G/100ML
2 INJECTION, SOLUTION INTRAVENOUS ONCE
OUTPATIENT
Start: 2025-03-10 | End: 2025-03-10

## 2025-03-10 NOTE — PROGRESS NOTES
Chief Complaint   Patient presents with    Follow-up     RT SECOND HAMMER TOE AND AMALIA   Patient following up second digit right foot.  Ongoing discomfort crossover hammertoe appreciated on the hallux.  She would like to consider surgery sometime in April.    Patient referred by primary care physician  Remote history of hammertoe surgery second digit right foot this was in December 2016.  States that was never right after surgery.  Toe is overlapping hallux at this time significantly.  Painful.  Antalgic gait.  Difficulty with shoe gear.  No constitutional symptoms.  She does bring up amputation of the digit.  Since last spring to summer further cross over the second digit and pain at the first MTP joint.  Nondiabetic.  Positive hypertension.  Anxiety.  Denies cardiovascular disease.  Medications and allergies reviewed.  Here with daughter Radha.   Remote smoker.  Retired speech therapist and .     Review of systems negative except as noted above.      General/Constitutional: Alert. NAD.   Respiratory: Non labored breathing.   Psychiatric: Mood and affect normal/baseline.   HEENT: Sclera clear.   Dermatologic: Skin and nails intact.  She continues with baseline pressure area beneath the second MTP joint.  Skin is intact.  Mildly indurated.  No acute inflammatory infectious process. Web spaces are dry. No ulcers no pre-ulcerative areas.  Vascular: Pedal pulses are intact and symmetric including the dorsalis pedis and the posterior tibial pulses. Feet are warm to touch. No swelling appreciated either extremity.  Neurological: Alert and oriented. No acute distress. No sensory impairment bilateral.  Musculoskeletal: Strength is normal for age. No acute deficits appreciated.  Painful and significant rigidly displaced second digit right foot it completely overlaps the hallux.  Proximal phalanx is displaced 90 degrees.  Hallux valgus deformity with extensor tendon contractures.  Splayfoot deformity    X-rays  reviewed by myself demonstrates significant hallux valgus deformity of the right foot.  Splayfoot deformity.  Significant displaces hammertoe right foot.    Impression: Painful hammertoe deformity rigidly displaced.  Hallux valgus deformity with contracted extensor hallucis longus tendon    -Today's treatment and course of therapy was discussed with the patient in detail. Patient's questions were answered. Proper foot care was discussed. This dictation was done using Dragon computer software and as such may contain grammatical errors.    -Once again review x-ray findings of displaced great toe and subluxed contracted second digit of the right foot.    -I did review in detail simple amputation of the second digit along with extensor tendon lengthening of the big toe.  I also reviewed more aggressive reconstruction of the forefoot which would include osteotomies of the great toe joint and fixation which she does not want to pursue.    -The risk of infection delayed healing swelling and other problems were discussed with her.    -She is agreeable to take time off from work.    -Preadmission testing discussed.  MAC anesthesia discussed.  Informed consent obtained.  Questions answered.  If any other changes concerns or questions please let me know.    -Labs from 10/22/2024 reviewed.    -Medicine note 10/21/2024 reviewed.

## 2025-03-12 DIAGNOSIS — E78.2 MIXED HYPERLIPIDEMIA: ICD-10-CM

## 2025-03-12 DIAGNOSIS — I10 BENIGN ESSENTIAL HYPERTENSION: ICD-10-CM

## 2025-03-12 RX ORDER — ROSUVASTATIN CALCIUM 10 MG/1
10 TABLET, COATED ORAL DAILY
Qty: 90 TABLET | Refills: 3 | Status: SHIPPED | OUTPATIENT
Start: 2025-03-12

## 2025-03-12 RX ORDER — LOSARTAN POTASSIUM 50 MG/1
50 TABLET ORAL DAILY
Qty: 90 TABLET | Refills: 3 | Status: SHIPPED | OUTPATIENT
Start: 2025-03-12

## 2025-04-04 ENCOUNTER — PRE-ADMISSION TESTING (OUTPATIENT)
Dept: PREADMISSION TESTING | Facility: HOSPITAL | Age: 75
End: 2025-04-04
Payer: MEDICARE

## 2025-04-07 DIAGNOSIS — F51.01 PRIMARY INSOMNIA: ICD-10-CM

## 2025-04-08 ENCOUNTER — PRE-ADMISSION TESTING (OUTPATIENT)
Dept: PREADMISSION TESTING | Facility: HOSPITAL | Age: 75
End: 2025-04-08
Payer: MEDICARE

## 2025-04-08 VITALS
RESPIRATION RATE: 18 BRPM | WEIGHT: 167.55 LBS | HEART RATE: 80 BPM | OXYGEN SATURATION: 97 % | TEMPERATURE: 97.5 F | HEIGHT: 65 IN | BODY MASS INDEX: 27.92 KG/M2

## 2025-04-08 DIAGNOSIS — Z01.818 PREOP TESTING: Primary | ICD-10-CM

## 2025-04-08 LAB
ANION GAP SERPL CALC-SCNC: 11 MMOL/L (ref 10–20)
BUN SERPL-MCNC: 16 MG/DL (ref 6–23)
CALCIUM SERPL-MCNC: 9.5 MG/DL (ref 8.6–10.3)
CHLORIDE SERPL-SCNC: 102 MMOL/L (ref 98–107)
CO2 SERPL-SCNC: 30 MMOL/L (ref 21–32)
CREAT SERPL-MCNC: 0.75 MG/DL (ref 0.5–1.05)
EGFRCR SERPLBLD CKD-EPI 2021: 83 ML/MIN/1.73M*2
ERYTHROCYTE [DISTWIDTH] IN BLOOD BY AUTOMATED COUNT: 14.4 % (ref 11.5–14.5)
GLUCOSE SERPL-MCNC: 97 MG/DL (ref 74–99)
HCT VFR BLD AUTO: 38 % (ref 36–46)
HGB BLD-MCNC: 12.4 G/DL (ref 12–16)
MCH RBC QN AUTO: 30.8 PG (ref 26–34)
MCHC RBC AUTO-ENTMCNC: 32.6 G/DL (ref 32–36)
MCV RBC AUTO: 95 FL (ref 80–100)
NRBC BLD-RTO: 0 /100 WBCS (ref 0–0)
PLATELET # BLD AUTO: 232 X10*3/UL (ref 150–450)
POTASSIUM SERPL-SCNC: 4.2 MMOL/L (ref 3.5–5.3)
RBC # BLD AUTO: 4.02 X10*6/UL (ref 4–5.2)
SODIUM SERPL-SCNC: 139 MMOL/L (ref 136–145)
WBC # BLD AUTO: 6.4 X10*3/UL (ref 4.4–11.3)

## 2025-04-08 PROCEDURE — 93005 ELECTROCARDIOGRAM TRACING: CPT

## 2025-04-08 PROCEDURE — 99204 OFFICE O/P NEW MOD 45 MIN: CPT

## 2025-04-08 PROCEDURE — 87081 CULTURE SCREEN ONLY: CPT | Mod: PARLAB

## 2025-04-08 PROCEDURE — 82374 ASSAY BLOOD CARBON DIOXIDE: CPT | Performed by: PODIATRIST

## 2025-04-08 PROCEDURE — 85027 COMPLETE CBC AUTOMATED: CPT | Performed by: PODIATRIST

## 2025-04-08 RX ORDER — CHLORHEXIDINE GLUCONATE ORAL RINSE 1.2 MG/ML
15 SOLUTION DENTAL DAILY
Qty: 30 ML | Refills: 0 | Status: SHIPPED | OUTPATIENT
Start: 2025-04-08 | End: 2025-04-10

## 2025-04-08 RX ORDER — CHLORHEXIDINE GLUCONATE 40 MG/ML
SOLUTION TOPICAL DAILY
Qty: 118 ML | Refills: 0 | Status: SHIPPED | OUTPATIENT
Start: 2025-04-08 | End: 2025-04-11 | Stop reason: ALTCHOICE

## 2025-04-08 RX ORDER — POTASSIUM CHLORIDE 600 MG/1
8 TABLET, FILM COATED, EXTENDED RELEASE ORAL DAILY
COMMUNITY

## 2025-04-08 RX ORDER — ZOLPIDEM TARTRATE 10 MG/1
TABLET ORAL
Qty: 90 TABLET | Refills: 0 | Status: SHIPPED | OUTPATIENT
Start: 2025-04-08

## 2025-04-08 ASSESSMENT — DUKE ACTIVITY SCORE INDEX (DASI)
TOTAL_SCORE: 58.2
CAN YOU CLIMB A FLIGHT OF STAIRS OR WALK UP A HILL: YES
CAN YOU DO HEAVY WORK AROUND THE HOUSE LIKE SCRUBBING FLOORS OR LIFTING AND MOVING HEAVY FURNITURE: YES
CAN YOU DO YARD WORK LIKE RAKING LEAVES, WEEDING OR PUSHING A MOWER: YES
CAN YOU DO LIGHT WORK AROUND THE HOUSE LIKE DUSTING OR WASHING DISHES: YES
CAN YOU PARTICIPATE IN STRENOUS SPORTS LIKE SWIMMING, SINGLES TENNIS, FOOTBALL, BASKETBALL, OR SKIING: YES
CAN YOU TAKE CARE OF YOURSELF (EAT, DRESS, BATHE, OR USE TOILET): YES
DASI METS SCORE: 9.9
CAN YOU DO MODERATE WORK AROUND THE HOUSE LIKE VACUUMING, SWEEPING FLOORS OR CARRYING GROCERIES: YES
CAN YOU WALK INDOORS, SUCH AS AROUND YOUR HOUSE: YES
CAN YOU WALK A BLOCK OR TWO ON LEVEL GROUND: YES
CAN YOU HAVE SEXUAL RELATIONS: YES
CAN YOU RUN A SHORT DISTANCE: YES
CAN YOU PARTICIPATE IN MODERATE RECREATIONAL ACTIVITIES LIKE GOLF, BOWLING, DANCING, DOUBLES TENNIS OR THROWING A BASEBALL OR FOOTBALL: YES

## 2025-04-08 ASSESSMENT — PAIN SCALES - GENERAL: PAINLEVEL_OUTOF10: 4

## 2025-04-08 ASSESSMENT — PAIN DESCRIPTION - DESCRIPTORS: DESCRIPTORS: THROBBING

## 2025-04-08 ASSESSMENT — PAIN - FUNCTIONAL ASSESSMENT: PAIN_FUNCTIONAL_ASSESSMENT: 0-10

## 2025-04-08 NOTE — CPM/PAT H&P
CPM/PAT Evaluation       Name: Sruthi Sanders (Sruthi Sanders)  /Age: 1950/74 y.o.     Visit Type:   In-Person       Chief Complaint: right foot  pain requiring intervention    HPI  Patient is a 74 year old female with a history of HTN, HLD, CAD, GERD, anxiety and insomnia who presents today for preoperative evaluation. Patient follows with Dr. Whitfield for hammer toe and hallux valgus of the right foot. She is scheduled for right foot second toe amputation and right extensor hallucis longus tendon repair under MAC anesthesia on 25. Patient denies recent illness, fever, chills, fatigue, cough, shortness of breath, chest pain, lower extremity edema, urinary or GI symptoms.     Past Medical History:   Diagnosis Date    Abnormal result of other cardiovascular function study 2018    Abnormal nuclear stress test    Acute candidiasis of vulva and vagina 2017    Candidal vaginitis    Acute upper respiratory infection, unspecified 2019    Acute URI    Allergic 08/10/1969    Anxiety 09/10/2021    Asymptomatic menopausal state 2016    Post-menopausal    Candidiasis, unspecified     Yeast infection    Diverticulum of esophagus, acquired 2017    Zenker diverticulum s/p surgery    Dysphagia, pharyngeal phase 2014    Dysphagia, pharyngeal    GERD (gastroesophageal reflux disease) 09/10/20    Hyperlipidemia     Hypertension 09/10/2004    Other chest pain 2018    Chest pain, midsternal    Pain in right arm 2018    Pain in both upper extremities    Personal history of diseases of the blood and blood-forming organs and certain disorders involving the immune mechanism     History of anemia    Personal history of other diseases of the circulatory system     History of hypertension    Personal history of other diseases of the digestive system     History of esophageal reflux    Personal history of other diseases of the musculoskeletal system and connective tissue      History of arthritis    Personal history of other diseases of the nervous system and sense organs 2016    History of subconjunctival hemorrhage    Personal history of other diseases of the respiratory system 2017    History of acute sinusitis    Personal history of other endocrine, nutritional and metabolic disease 2018    History of hyperlipidemia    Personal history of other infectious and parasitic diseases 2017    History of herpes zoster    Personal history of other specified conditions 2018    History of chest pain    Personal history of other specified conditions 2016    History of dysphagia    Personal history of other specified conditions 2019    History of neck swelling    Personal history of urinary (tract) infections 2018    History of urinary tract infection    Unspecified abdominal hernia without obstruction or gangrene     Hernia       Past Surgical History:   Procedure Laterality Date    APPENDECTOMY  2016    Appendectomy    CARDIAC CATHETERIZATION       SECTION, CLASSIC  2016     Section     SECTION, LOW TRANSVERSE      COLONOSCOPY      DILATION AND CURETTAGE OF UTERUS      FOOT SURGERY      HERNIA REPAIR  2015    Hernia Repair x3 with mesh    OTHER SURGICAL HISTORY Bilateral     Hammer toe surgery 09/10/2016    OTHER SURGICAL HISTORY      uterine suspension    UPPER GASTROINTESTINAL ENDOSCOPY      WISDOM TOOTH EXTRACTION         Patient  reports being sexually active and has had partner(s) who are male. She reports using the following method of birth control/protection: Post-menopausal.    Family History   Problem Relation Name Age of Onset    Heart attack Mother Shana     Stroke Mother Shana     Miscarriages / Stillbirths Mother Shana     Vision loss Mother Shana     Coronary artery disease Father Bill     Liver cancer Father Bill     Alcohol abuse Father Bill     Cancer Father Bill     Heart disease Father Raymond      Hypertension Father Bill     Skin cancer Sister      Arthritis Maternal Grandmother Lizette        Allergies   Allergen Reactions    Iodine Unknown     nausea    Shellfish Derived Nausea/vomiting       Prior to Admission medications    Medication Sig Start Date End Date Taking? Authorizing Provider   ascorbic acid (Vitamin C) 1,000 mg tablet Take 1 tablet (1,000 mg) by mouth once daily. 6/27/14  Yes Historical Provider, MD   BEE POLLEN ORAL Take 1.5 tablets by mouth once daily.   Yes Historical Provider, MD   biotin 10 mg tablet Take 1 tablet (10 mg) by mouth once daily. 1/27/17  Yes Historical Provider, MD   busPIRone (Buspar) 15 mg tablet TAKE 1 TABLET EVERY 12 HOURS 8/28/24  Yes Gretchen Morales MD   chlorhexidine (Hibiclens) 4 % external liquid Apply topically early in the morning. for 5 days. Use wash as directed daily for 5 days prior to surgery with the 5th day being the morning of surgery. 4/8/25 4/13/25  TRACEE Elizondo   chlorhexidine (Peridex) 0.12 % solution Use 15 mL in the mouth or throat early in the morning. for 2 days. Rinse mouth by swishing medication and spitting. Use daily for 2 days prior to surgery with the 2nd day being the morning of surgery. 4/8/25 4/10/25  TRACEE Elizondo   cholecalciferol (Vitamin D-3) 25 MCG (1000 UT) capsule Take 1 capsule (25 mcg) by mouth.    Historical Provider, MD   L. acidophilus/Bifid. animalis 32 billion cell capsule Take 1 capsule by mouth 2 times a day. 1/27/17  Yes Historical Provider, MD   losartan (Cozaar) 50 mg tablet TAKE 1 TABLET EVERY DAY 3/12/25  Yes Gretchen Morales MD   magnesium oxide 500 mg capsule Take by mouth. 9/26/17  Yes Historical Provider, MD   multivit,calc,mins/iron/folic (WOMEN'S ONE DAILY ORAL) Take 1 tablet by mouth once daily.   Yes Historical Provider, MD   NON FORMULARY Bone strength claudette/mag/[id number]   Yes Historical Provider, MD   NON FORMULARY Propolis 600mg bid   Yes Historical Provider, MD   pantoprazole  "(ProtoNix) 40 mg EC tablet TAKE 1 TABLET THREE TIMES A WEEK 10/24/24  Yes Gretchen Morales MD   rosuvastatin (Crestor) 10 mg tablet TAKE 1 TABLET EVERY DAY 3/12/25  Yes Gretchen Morales MD   royal jelly 500 mg capsule Take by mouth twice a day. 9/11/17  Yes Historical Provider, MD   vitamin E acid succinate (vitamin E succinate) 134 mg (200 unit) tablet Take 1 tablet by mouth once daily. 1/27/17  Yes Historical Provider, MD   zinc sulfate (Zincate) 220 (50 Zn) MG capsule Take by mouth.   Yes Historical Provider, MD   zolpidem (Ambien) 10 mg tablet Take 1 tablet (10 mg) by mouth as needed at bedtime for sleep. 11/12/24 5/11/25 Yes Gretchen Morales MD        A ten-point review of systems was completed and is otherwise negative except for what is mentioned in the HPI above.    Physical Exam:    GENERAL: Well developed, awake/alert/oriented x3, no distress, alert and cooperative  HEENT: MMM  NECK: Trachea midline, no lymphadenopathy  CARDIOVASCULAR: RRR, normal S1 and S 2, no murmurs, 2+ equal pulses of the extremities  RESPIRATORY: Patent airways, CTAB, normal breath sounds with good chest expansion, thorax symmetric  ABDOMEN: Soft, non-tender, no distention  SKIN: Warm and dry  EXTREMITIES: No cyanosis. Trace pitting edema to bilateral shins. Compression socks in use  NEURO: A&O x 3, normal motor and sensation, no focal deficits   PSYCH: Appropriate mood and behavior      PAT AIRWAY:   Airway:     Mallampati::  I    TM distance::  >3 FB    Neck ROM::  Full  normal          Visit Vitals  Pulse 80   Temp 36.4 °C (97.5 °F) (Tympanic)   Resp 18   Ht 1.651 m (5' 5\")   Wt 76 kg (167 lb 8.8 oz)   SpO2 97%   BMI 27.88 kg/m²   Smoking Status Former   BSA 1.87 m²       DASI Risk Score      Flowsheet Row Pre-Admission Testing from 4/8/2025 in [facility]   Can you take care of yourself (eat, dress, bathe, or use toilet)?  2.75 filed at 04/08/2025 1106   Can you walk indoors, such as around your house? 1.75 filed at " 04/08/2025 1106   Can you walk a block or two on level ground?  2.75 filed at 04/08/2025 1106   Can you climb a flight of stairs or walk up a hill? 5.5 filed at 04/08/2025 1106   Can you run a short distance? 8 filed at 04/08/2025 1106   Can you do light work around the house like dusting or washing dishes? 2.7 filed at 04/08/2025 1106   Can you do moderate work around the house like vacuuming, sweeping floors or carrying groceries? 3.5 filed at 04/08/2025 1106   Can you do heavy work around the house like scrubbing floors or lifting and moving heavy furniture?  8 filed at 04/08/2025 1106   Can you do yard work like raking leaves, weeding or pushing a mower? 4.5 filed at 04/08/2025 1106   Can you have sexual relations? 5.25 filed at 04/08/2025 1106   Can you participate in moderate recreational activities like golf, bowling, dancing, doubles tennis or throwing a baseball or football? 6 filed at 04/08/2025 1106   Can you participate in strenous sports like swimming, singles tennis, football, basketball, or skiing? 7.5 filed at 04/08/2025 1106   DASI SCORE 58.2 filed at 04/08/2025 1106   METS Score (Will be calculated only when all the questions are answered) 9.9 filed at 04/08/2025 1106          Caprini DVT Assessment    No data to display       Modified Frailty Index    No data to display       [id number] Stroke Risk Points  Current as of just now        N/A 0 to 9 Points:      Last Change: N/A          The [id number] risk score (Lip GH, et al. 2009. © 2010 [employer]) quantifies the risk of stroke for a patient with atrial fibrillation. For patients without atrial fibrillation or under the age of 18 this score appears as N/A. Higher score values generally indicate higher risk of stroke.        This score is not applicable to this patient. Components are not calculated.          Revised Cardiac Risk Index    No data to display       Apfel Simplified Score    No data to display        Risk Analysis Index Results This Encounter    No data found in the last 10 encounters.       Stop Bang Score      Flowsheet Row Pre-Admission Testing from 4/8/2025 in [facility]   Do you snore loudly? 0 filed at 04/08/2025 1106   Do you often feel tired or fatigued after your sleep? 0 filed at 04/08/2025 1106   Has anyone ever observed you stop breathing in your sleep? 0 filed at 04/08/2025 1106   Recent BMI (Calculated) 27.9 filed at 04/08/2025 1106   Is BMI greater than 35 kg/m2? 0=No filed at 04/08/2025 1106   Age older than 50 years old? 1=Yes filed at 04/08/2025 1106   Is your neck circumference greater than 17 inches (Male) or 16 inches (Female)? 0 filed at 04/08/2025 1106   Gender - Male 0=No filed at 04/08/2025 1106          Prodigy: High Risk  Total Score: 12              Prodigy Age Score           ARISCAT Score for Postoperative Pulmonary Complications      Flowsheet Row Pre-Admission Testing from 4/8/2025 in [facility]   Age Calculated Score 3 filed at 04/08/2025 1100   Preoperative SpO2 0 filed at 04/08/2025 1100   Respiratory infection in the last month Either upper or lower (i.e., URI, bronchitis, pneumonia), with fever and antibiotic treatment 0 filed at 04/08/2025 1100   Preoperative anemia (Hgb less than 10 g/dl) 0 filed at 04/08/2025 1100   Surgical incision  0 filed at 04/08/2025 1100   Duration of surgery  0 filed at 04/08/2025 1100   Emergency Procedure  0 filed at 04/08/2025 1100   ARISCAT Total Score  3 filed at 04/08/2025 1100          Olivares Perioperative Risk for Myocardial Infarction or Cardiac Arrest (PETER)      Flowsheet Row Pre-Admission Testing from 4/8/2025 in [facility]   Calculated Age Score 1.5 filed at 04/08/2025 1100   Functional Status  0 filed at 04/08/2025 1100   ASA Class  -3.29 filed at 04/08/2025 1100   Creatinine 0 filed at 04/08/2025 1100   Type of Procedure  0.80 filed at 04/08/2025 1100   PETER Total Score  -6.24 filed at  04/08/2025 1100   PETER % 0.19 filed at 04/08/2025 1100            Assessment and Plan:   Patient is a 74 year old female with a history of HTN, HLD, CAD, GERD, anxiety and insomnia.    #Hammer toe of the right foot  #Hallux valgus of the right foot.   She is scheduled for right foot second toe amputation and right extensor hallucis longus tendon repair under MAC anesthesia on 4/16/25 with Dr. Whtifield    #HTN, Hx  #HLD, Hx  #CAD, Hx  -Currently maintained on losartan and rosuvastatin    >BP elevated today in the 150s/90s, patient states she monitors daily at home and she has been in the 1-teens-130s/80s consistently  -Stress test in 2018 was falsely positive > LHC in 2018 showed 30% stenosis of the LAD  -Lipid panel from 10/2024 shows adequate control  -EKG obtained today demonstrates normal sinus rhythm with left anterior fascicular block, pulmonary disease pattern and nonspecific ST and T wave abnormalities     >Denies any cardiac symptoms. Advised to follow up with PCP at routine visit for monitoring of abnormal EKG    #GERD, Hx  Maintained on  pantoprazole with no current issues of reflux or dyspepsia    #Anxiety, Hx  Currently maintained on buspirone and well controled    #Insomnia, Hx  Currently maintained on zolpidem     Labs obtained today and pending (bmp, cbc, mrsa)  EKG obtained today demonstrates normal sinus rhythm with left anterior fascicular block, pulmonary disease pattern and nonspecific ST and T wave abnormalities     I spent 45 minutes in the professional and overall care of this patient. Greater than 50% of this time was spent counseling patient, reviewing plan of care and discussing medication perioperative management.    Kandis Lopez, APRN-CNP

## 2025-04-08 NOTE — PREPROCEDURE INSTRUCTIONS
Medication List            Accurate as of April 8, 2025 11:43 AM. Always use your most recent med list.                ascorbic acid 1,000 mg tablet  Commonly known as: Vitamin C  Additional Medication Adjustments for Surgery: Take last dose 7 days before surgery     BEE POLLEN ORAL  Additional Medication Adjustments for Surgery: Take last dose 7 days before surgery     biotin 10 mg tablet  Additional Medication Adjustments for Surgery: Take last dose 7 days before surgery     busPIRone 15 mg tablet  Commonly known as: Buspar  TAKE 1 TABLET EVERY 12 HOURS  Medication Adjustments for Surgery: Take/Use as prescribed     * chlorhexidine 4 % external liquid  Commonly known as: Hibiclens  Apply topically early in the morning. for 5 days. Use wash as directed daily for 5 days prior to surgery with the 5th day being the morning of surgery.  Medication Adjustments for Surgery: Take/Use as prescribed     * chlorhexidine 0.12 % solution  Commonly known as: Peridex  Use 15 mL in the mouth or throat early in the morning. for 2 days. Rinse mouth by swishing medication and spitting. Use daily for 2 days prior to surgery with the 2nd day being the morning of surgery.  Medication Adjustments for Surgery: Take/Use as prescribed     cholecalciferol 25 MCG (1000 UT) capsule  Commonly known as: Vitamin D-3  Additional Medication Adjustments for Surgery: Take last dose 7 days before surgery     L. acidophilus/Bifid. animalis 32 billion cell capsule  Additional Medication Adjustments for Surgery: Take last dose 7 days before surgery     losartan 50 mg tablet  Commonly known as: Cozaar  TAKE 1 TABLET EVERY DAY  Medication Adjustments for Surgery: Take last dose 1 day (24 hours) before surgery     magnesium oxide 500 mg capsule  Medication Adjustments for Surgery: Take/Use as prescribed     NON FORMULARY  Additional Medication Adjustments for Surgery: Take last dose 7 days before surgery     NON FORMULARY  Additional Medication  Adjustments for Surgery: Take last dose 7 days before surgery     NON FORMULARY  Additional Medication Adjustments for Surgery: Take last dose 7 days before surgery     NON FORMULARY  Additional Medication Adjustments for Surgery: Take last dose 7 days before surgery     pantoprazole 40 mg EC tablet  Commonly known as: ProtoNix  TAKE 1 TABLET THREE TIMES A WEEK  Medication Adjustments for Surgery: Take/Use as prescribed     potassium chloride CR 8 mEq ER tablet  Commonly known as: Klor-Con  Medication Adjustments for Surgery: Take/Use as prescribed     rosuvastatin 10 mg tablet  Commonly known as: Crestor  TAKE 1 TABLET EVERY DAY  Medication Adjustments for Surgery: Take/Use as prescribed     royal jelly 500 mg capsule  Additional Medication Adjustments for Surgery: Take last dose 7 days before surgery     vitamin E succinate 134 mg (200 unit) tablet  Additional Medication Adjustments for Surgery: Take last dose 7 days before surgery     vitamins A,C,E-zinc-copper 4,296 mcg-226 mg-90 mg capsule  Additional Medication Adjustments for Surgery: Take last dose 7 days before surgery     WOMEN'S ONE DAILY ORAL  Additional Medication Adjustments for Surgery: Take last dose 7 days before surgery     zinc sulfate 220 (50 Zn) MG capsule  Commonly known as: Zincate  Additional Medication Adjustments for Surgery: Take last dose 7 days before surgery     zolpidem 10 mg tablet  Commonly known as: Ambien  Take 1 tablet (10 mg) by mouth as needed at bedtime for sleep.  Medication Adjustments for Surgery: Take/Use as prescribed           * This list has 2 medication(s) that are the same as other medications prescribed for you. Read the directions carefully, and ask your doctor or other care provider to review them with you.                                  NPO Instructions:    Do not eat any food after midnight the night before your surgery/procedure.    Additional Instructions:     Day of Surgery:  You may have clear liquids until  TWO hours before surgery/procedure.  This includes water, black tea/coffee, (no milk or cream) apple juice and electrolyte drinks (Gatorade)  Wear  comfortable loose fitting clothing  Do not use moisturizers, creams, lotions or perfume  All jewelry and valuables should be left at home      PRE-OPERATIVE INSTRUCTIONS FOR SURGERY    *Do not eat anything after midnight the night of surgery.  This includes food of any kind (including hard candy, cough drops, mints).   You may have up to 13 ounces of clear liquid  until TWO hours prior to your scheduled surgery time, unless ERAS* protocol is ordered for you.  Clear liquids include water, black tea/coffee, (no milk or cream) apple juice and electrolyte drinks (GATORADE).  You may chew gum until TWO hours prior you your surgery/procedure.        *One of our staff members will call you ONE business day before your surgery, between 11 am-2 pm to let you know the time to arrive.  If you have not received a call by 2 pm, call [phone]  This is the surgery schedulers they will call you the day prior     *When you arrive at the hospital-->GO TO Registration on the ground floor  *Stop smoking 24 hours prior to surgery.  No Marijuana, CBD Oil or Vaping for 48 hours  *No alcohol 24 hours prior to surgery  *You will need a responsible adult to drive you home  -No acrylic nails or nail polish on at least one fingernail, NO polish on toes for foot surgery  -You may be asked to remove your dentures, partial plate, eyeglasses or contact lenses before going to surgery.  Please bring a case for these items.  -Body piercings need to be removed.  Jewelry and valuables should be left at home.  -Put on loose,  comfortable, clean clothing, that will accommodate bandages        What is a home antibacterial shower?  This shower is a way of cleaning the skin with a germ killing solution before surgery.  The solution contains chlorhexidine, commonly known as CHG.  CHG is a skin cleanser with  germ killing ability.  Let your doctor know if you are allergic to chlorhexidine.    Why do I need to take a preoperative antibacterial shower?  Skin is not sterile.  It is best to try to make your skin as free of germs as possible before surgery.  Proper cleansing with a germ killing soap before surgery can lower the number of germs on your skin.  This helps to reduce the risk of infection at the surgical site.  Following the instructions listed below will help you prepare your skin for surgery.      How do I use the solution?    Steps: Begin using your CHG soap 5 days before your surgery on ________as ordered__________.    *First, wash and rinse your hair using the CHG soap.  Keep CHG soap away from ear canals and eyes.   Rinse completely, do not condition.  Hair extensions should be removed.    *Wash your face with your normal soap and rinse.   *Apply the CHG solution to a clean wet washcloth.  Turn the water off or move away from the water spray to avoid premature rinsing of the CHG soap as you are applying.  Firmly lather your entire body from the neck down.  Do not use on your face.    *Pay special attention to the area(s) where your incision(s) will be located unless they are on your face.  Avoid scrubbing your skin too hard.  The important part is to have the CHG soap sit on your skin for 3 minutes.   *When the 3 minutes are up, turn on the water and rinse the CHG solution off your body completely.  *Do not wash with regular soap after you  have  used the CHG soap solution.  *Pat  yourself dry with a clean, freshly laundered towel.  *Do not apply powders, deodorants or lotions.  *Dress in clean freshly laundered night clothes.    *Be sure to sleep with clean freshly laundered sheets.    *Be aware the CHG will cause stains on fabrics; if you wash them with bleach after use.  Rinse your washcloth and other linens that have contact with CHG completely.  Use only non-chlorine detergents to launder the items   used.  *The morning of surgery is the fifth day.  Repeat the above steps and dress in clean comfortable clothing.     What is oral/dental rinse?  It is mouthwash.  It is a way of cleaning the he mouth with a germ-killing solution before your surgery.  The solution contains chlorhexidine, commonly known as CHG.  It is used inside the mouth to kill a bacteria known as Staphylococcus aureus.  Let your doctor know if you are allergic to Chlorhexidine.    Why do I need to use CHG oral/ dental rinse?  The CHG oral/dental rinse helps to kill bacteria in your mouth know as Staphylococcus aureus.  This reduces the risk of infection at the surgical site.    Using your CHG oral/dental rinse    STEPS:    Use your CHG oral/dental rinse after you brush your teeth the night before (at bedtime) and the morning of your surgery.  Follow all the directions on your prescription label.  *Use the cap on the container to measure 15 ml  *Swish (gargle if you can) the mouthwash in your mouth for at least 30 seconds, (do not swallow) and spit out  *After you use your CHG rinse, do not rinse your mouth with water, drink or eat.  Please refer to the prescription label for the appropriate time to resume oral intake.    What side effects might I have using the CHG oral/dental rinse?  CHG rinse will stick you plaque on the teeth.  Brush and floss just before use.   Teeth brushing will help avoid staining of the plaque during  use.  Teeth brushing will help avoid staining of plaque during  use.      *If you have any further questions about your pre-op instructions,  not mentioned in this handout, then call [phone]* this is the nurse line for medical questions

## 2025-04-08 NOTE — H&P (VIEW-ONLY)
CPM/PAT Evaluation       Name: Sruthi Sanders (Sruthi Sanders)  /Age: 1950/74 y.o.     Visit Type:   In-Person       Chief Complaint: right foot  pain requiring intervention    HPI  Patient is a 74 year old female with a history of HTN, HLD, CAD, GERD, anxiety and insomnia who presents today for preoperative evaluation. Patient follows with Dr. Whitfield for hammer toe and hallux valgus of the right foot. She is scheduled for right foot second toe amputation and right extensor hallucis longus tendon repair under MAC anesthesia on 25. Patient denies recent illness, fever, chills, fatigue, cough, shortness of breath, chest pain, lower extremity edema, urinary or GI symptoms.     Past Medical History:   Diagnosis Date    Abnormal result of other cardiovascular function study 2018    Abnormal nuclear stress test    Acute candidiasis of vulva and vagina 2017    Candidal vaginitis    Acute upper respiratory infection, unspecified 2019    Acute URI    Allergic 08/10/1969    Anxiety 09/10/2021    Asymptomatic menopausal state 2016    Post-menopausal    Candidiasis, unspecified     Yeast infection    Diverticulum of esophagus, acquired 2017    Zenker diverticulum s/p surgery    Dysphagia, pharyngeal phase 2014    Dysphagia, pharyngeal    GERD (gastroesophageal reflux disease) 09/10/20    Hyperlipidemia     Hypertension 09/10/2004    Other chest pain 2018    Chest pain, midsternal    Pain in right arm 2018    Pain in both upper extremities    Personal history of diseases of the blood and blood-forming organs and certain disorders involving the immune mechanism     History of anemia    Personal history of other diseases of the circulatory system     History of hypertension    Personal history of other diseases of the digestive system     History of esophageal reflux    Personal history of other diseases of the musculoskeletal system and connective tissue      History of arthritis    Personal history of other diseases of the nervous system and sense organs 2016    History of subconjunctival hemorrhage    Personal history of other diseases of the respiratory system 2017    History of acute sinusitis    Personal history of other endocrine, nutritional and metabolic disease 2018    History of hyperlipidemia    Personal history of other infectious and parasitic diseases 2017    History of herpes zoster    Personal history of other specified conditions 2018    History of chest pain    Personal history of other specified conditions 2016    History of dysphagia    Personal history of other specified conditions 2019    History of neck swelling    Personal history of urinary (tract) infections 2018    History of urinary tract infection    Unspecified abdominal hernia without obstruction or gangrene     Hernia       Past Surgical History:   Procedure Laterality Date    APPENDECTOMY  2016    Appendectomy    CARDIAC CATHETERIZATION       SECTION, CLASSIC  2016     Section     SECTION, LOW TRANSVERSE      COLONOSCOPY      DILATION AND CURETTAGE OF UTERUS      FOOT SURGERY      HERNIA REPAIR  2015    Hernia Repair x3 with mesh    OTHER SURGICAL HISTORY Bilateral     Hammer toe surgery 09/10/2016    OTHER SURGICAL HISTORY      uterine suspension    UPPER GASTROINTESTINAL ENDOSCOPY      WISDOM TOOTH EXTRACTION         Patient  reports being sexually active and has had partner(s) who are male. She reports using the following method of birth control/protection: Post-menopausal.    Family History   Problem Relation Name Age of Onset    Heart attack Mother Shana     Stroke Mother Shana     Miscarriages / Stillbirths Mother Shana     Vision loss Mother Shana     Coronary artery disease Father Bill     Liver cancer Father Bill     Alcohol abuse Father Bill     Cancer Father Bill     Heart disease Father Raymond      Hypertension Father Bill     Skin cancer Sister      Arthritis Maternal Grandmother Lizette        Allergies   Allergen Reactions    Iodine Unknown     nausea    Shellfish Derived Nausea/vomiting       Prior to Admission medications    Medication Sig Start Date End Date Taking? Authorizing Provider   ascorbic acid (Vitamin C) 1,000 mg tablet Take 1 tablet (1,000 mg) by mouth once daily. 6/27/14  Yes Historical Provider, MD   BEE POLLEN ORAL Take 1.5 tablets by mouth once daily.   Yes Historical Provider, MD   biotin 10 mg tablet Take 1 tablet (10 mg) by mouth once daily. 1/27/17  Yes Historical Provider, MD   busPIRone (Buspar) 15 mg tablet TAKE 1 TABLET EVERY 12 HOURS 8/28/24  Yes Gretchen Morales MD   chlorhexidine (Hibiclens) 4 % external liquid Apply topically early in the morning. for 5 days. Use wash as directed daily for 5 days prior to surgery with the 5th day being the morning of surgery. 4/8/25 4/13/25  TRACEE Elizondo   chlorhexidine (Peridex) 0.12 % solution Use 15 mL in the mouth or throat early in the morning. for 2 days. Rinse mouth by swishing medication and spitting. Use daily for 2 days prior to surgery with the 2nd day being the morning of surgery. 4/8/25 4/10/25  TRACEE Elizondo   cholecalciferol (Vitamin D-3) 25 MCG (1000 UT) capsule Take 1 capsule (25 mcg) by mouth.    Historical Provider, MD   L. acidophilus/Bifid. animalis 32 billion cell capsule Take 1 capsule by mouth 2 times a day. 1/27/17  Yes Historical Provider, MD   losartan (Cozaar) 50 mg tablet TAKE 1 TABLET EVERY DAY 3/12/25  Yes Gretchen Morales MD   magnesium oxide 500 mg capsule Take by mouth. 9/26/17  Yes Historical Provider, MD   multivit,calc,mins/iron/folic (WOMEN'S ONE DAILY ORAL) Take 1 tablet by mouth once daily.   Yes Historical Provider, MD   NON FORMULARY Bone strength claudette/mag/[id number]   Yes Historical Provider, MD   NON FORMULARY Propolis 600mg bid   Yes Historical Provider, MD   pantoprazole  "(ProtoNix) 40 mg EC tablet TAKE 1 TABLET THREE TIMES A WEEK 10/24/24  Yes Gretchen Morales MD   rosuvastatin (Crestor) 10 mg tablet TAKE 1 TABLET EVERY DAY 3/12/25  Yes Gretchen Morales MD   royal jelly 500 mg capsule Take by mouth twice a day. 9/11/17  Yes Historical Provider, MD   vitamin E acid succinate (vitamin E succinate) 134 mg (200 unit) tablet Take 1 tablet by mouth once daily. 1/27/17  Yes Historical Provider, MD   zinc sulfate (Zincate) 220 (50 Zn) MG capsule Take by mouth.   Yes Historical Provider, MD   zolpidem (Ambien) 10 mg tablet Take 1 tablet (10 mg) by mouth as needed at bedtime for sleep. 11/12/24 5/11/25 Yes Gretchen Morales MD        A ten-point review of systems was completed and is otherwise negative except for what is mentioned in the HPI above.    Physical Exam:    GENERAL: Well developed, awake/alert/oriented x3, no distress, alert and cooperative  HEENT: MMM  NECK: Trachea midline, no lymphadenopathy  CARDIOVASCULAR: RRR, normal S1 and S 2, no murmurs, 2+ equal pulses of the extremities  RESPIRATORY: Patent airways, CTAB, normal breath sounds with good chest expansion, thorax symmetric  ABDOMEN: Soft, non-tender, no distention  SKIN: Warm and dry  EXTREMITIES: No cyanosis. Trace pitting edema to bilateral shins. Compression socks in use  NEURO: A&O x 3, normal motor and sensation, no focal deficits   PSYCH: Appropriate mood and behavior      PAT AIRWAY:   Airway:     Mallampati::  I    TM distance::  >3 FB    Neck ROM::  Full  normal          Visit Vitals  Pulse 80   Temp 36.4 °C (97.5 °F) (Tympanic)   Resp 18   Ht 1.651 m (5' 5\")   Wt 76 kg (167 lb 8.8 oz)   SpO2 97%   BMI 27.88 kg/m²   Smoking Status Former   BSA 1.87 m²       DASI Risk Score      Flowsheet Row Pre-Admission Testing from 4/8/2025 in [facility]   Can you take care of yourself (eat, dress, bathe, or use toilet)?  2.75 filed at 04/08/2025 1106   Can you walk indoors, such as around your house? 1.75 filed at " 04/08/2025 1106   Can you walk a block or two on level ground?  2.75 filed at 04/08/2025 1106   Can you climb a flight of stairs or walk up a hill? 5.5 filed at 04/08/2025 1106   Can you run a short distance? 8 filed at 04/08/2025 1106   Can you do light work around the house like dusting or washing dishes? 2.7 filed at 04/08/2025 1106   Can you do moderate work around the house like vacuuming, sweeping floors or carrying groceries? 3.5 filed at 04/08/2025 1106   Can you do heavy work around the house like scrubbing floors or lifting and moving heavy furniture?  8 filed at 04/08/2025 1106   Can you do yard work like raking leaves, weeding or pushing a mower? 4.5 filed at 04/08/2025 1106   Can you have sexual relations? 5.25 filed at 04/08/2025 1106   Can you participate in moderate recreational activities like golf, bowling, dancing, doubles tennis or throwing a baseball or football? 6 filed at 04/08/2025 1106   Can you participate in strenous sports like swimming, singles tennis, football, basketball, or skiing? 7.5 filed at 04/08/2025 1106   DASI SCORE 58.2 filed at 04/08/2025 1106   METS Score (Will be calculated only when all the questions are answered) 9.9 filed at 04/08/2025 1106          Caprini DVT Assessment    No data to display       Modified Frailty Index    No data to display       [id number] Stroke Risk Points  Current as of just now        N/A 0 to 9 Points:      Last Change: N/A          The [id number]-WLXs risk score (Lip GH, et al. 2009. © 2010 [employer]) quantifies the risk of stroke for a patient with atrial fibrillation. For patients without atrial fibrillation or under the age of 18 this score appears as N/A. Higher score values generally indicate higher risk of stroke.        This score is not applicable to this patient. Components are not calculated.          Revised Cardiac Risk Index    No data to display       Apfel Simplified Score    No data to display        Risk Analysis Index Results This Encounter    No data found in the last 10 encounters.       Stop Bang Score      Flowsheet Row Pre-Admission Testing from 4/8/2025 in [facility]   Do you snore loudly? 0 filed at 04/08/2025 1106   Do you often feel tired or fatigued after your sleep? 0 filed at 04/08/2025 1106   Has anyone ever observed you stop breathing in your sleep? 0 filed at 04/08/2025 1106   Recent BMI (Calculated) 27.9 filed at 04/08/2025 1106   Is BMI greater than 35 kg/m2? 0=No filed at 04/08/2025 1106   Age older than 50 years old? 1=Yes filed at 04/08/2025 1106   Is your neck circumference greater than 17 inches (Male) or 16 inches (Female)? 0 filed at 04/08/2025 1106   Gender - Male 0=No filed at 04/08/2025 1106          Prodigy: High Risk  Total Score: 12              Prodigy Age Score           ARISCAT Score for Postoperative Pulmonary Complications      Flowsheet Row Pre-Admission Testing from 4/8/2025 in [facility]   Age Calculated Score 3 filed at 04/08/2025 1100   Preoperative SpO2 0 filed at 04/08/2025 1100   Respiratory infection in the last month Either upper or lower (i.e., URI, bronchitis, pneumonia), with fever and antibiotic treatment 0 filed at 04/08/2025 1100   Preoperative anemia (Hgb less than 10 g/dl) 0 filed at 04/08/2025 1100   Surgical incision  0 filed at 04/08/2025 1100   Duration of surgery  0 filed at 04/08/2025 1100   Emergency Procedure  0 filed at 04/08/2025 1100   ARISCAT Total Score  3 filed at 04/08/2025 1100          Olivares Perioperative Risk for Myocardial Infarction or Cardiac Arrest (PETER)      Flowsheet Row Pre-Admission Testing from 4/8/2025 in [facility]   Calculated Age Score 1.5 filed at 04/08/2025 1100   Functional Status  0 filed at 04/08/2025 1100   ASA Class  -3.29 filed at 04/08/2025 1100   Creatinine 0 filed at 04/08/2025 1100   Type of Procedure  0.80 filed at 04/08/2025 1100   PETER Total Score  -6.24 filed at  04/08/2025 1100   PETER % 0.19 filed at 04/08/2025 1100            Assessment and Plan:   Patient is a 74 year old female with a history of HTN, HLD, CAD, GERD, anxiety and insomnia.    #Hammer toe of the right foot  #Hallux valgus of the right foot.   She is scheduled for right foot second toe amputation and right extensor hallucis longus tendon repair under MAC anesthesia on 4/16/25 with Dr. Whitfield    #HTN, Hx  #HLD, Hx  #CAD, Hx  -Currently maintained on losartan and rosuvastatin    >BP elevated today in the 150s/90s, patient states she monitors daily at home and she has been in the 1-teens-130s/80s consistently  -Stress test in 2018 was falsely positive > LHC in 2018 showed 30% stenosis of the LAD  -Lipid panel from 10/2024 shows adequate control  -EKG obtained today demonstrates normal sinus rhythm with left anterior fascicular block, pulmonary disease pattern and nonspecific ST and T wave abnormalities     >Denies any cardiac symptoms. Advised to follow up with PCP at routine visit for monitoring of abnormal EKG    #GERD, Hx  Maintained on  pantoprazole with no current issues of reflux or dyspepsia    #Anxiety, Hx  Currently maintained on buspirone and well controled    #Insomnia, Hx  Currently maintained on zolpidem     Labs obtained today and pending (bmp, cbc, mrsa)  EKG obtained today demonstrates normal sinus rhythm with left anterior fascicular block, pulmonary disease pattern and nonspecific ST and T wave abnormalities     I spent 45 minutes in the professional and overall care of this patient. Greater than 50% of this time was spent counseling patient, reviewing plan of care and discussing medication perioperative management.    Kandis Lopez, APRN-CNP

## 2025-04-11 RX ORDER — CHLORHEXIDINE GLUCONATE ORAL RINSE 1.2 MG/ML
15 SOLUTION DENTAL DAILY
Qty: 30 ML | Refills: 0 | Status: SHIPPED | OUTPATIENT
Start: 2025-04-11 | End: 2025-04-13

## 2025-04-11 RX ORDER — CHLORHEXIDINE GLUCONATE 40 MG/ML
SOLUTION TOPICAL DAILY
Qty: 118 ML | Refills: 0 | Status: SHIPPED | OUTPATIENT
Start: 2025-04-11 | End: 2025-04-16

## 2025-04-12 LAB
ATRIAL RATE: 77 BPM
P AXIS: 11 DEGREES
P OFFSET: 195 MS
P ONSET: 141 MS
PR INTERVAL: 138 MS
Q ONSET: 210 MS
QRS COUNT: 13 BEATS
QRS DURATION: 82 MS
QT INTERVAL: 396 MS
QTC CALCULATION(BAZETT): 448 MS
QTC FREDERICIA: 430 MS
R AXIS: -66 DEGREES
T AXIS: 1 DEGREES
T OFFSET: 408 MS
VENTRICULAR RATE: 77 BPM

## 2025-04-16 ENCOUNTER — ANESTHESIA EVENT (OUTPATIENT)
Dept: OPERATING ROOM | Facility: HOSPITAL | Age: 75
End: 2025-04-16
Payer: MEDICARE

## 2025-04-16 ENCOUNTER — ANESTHESIA (OUTPATIENT)
Dept: OPERATING ROOM | Facility: HOSPITAL | Age: 75
End: 2025-04-16
Payer: MEDICARE

## 2025-04-16 ENCOUNTER — PHARMACY VISIT (OUTPATIENT)
Dept: PHARMACY | Facility: CLINIC | Age: 75
End: 2025-04-16
Payer: COMMERCIAL

## 2025-04-16 ENCOUNTER — HOSPITAL ENCOUNTER (OUTPATIENT)
Facility: HOSPITAL | Age: 75
Setting detail: OUTPATIENT SURGERY
Discharge: HOME | End: 2025-04-16
Attending: PODIATRIST | Admitting: PODIATRIST
Payer: MEDICARE

## 2025-04-16 VITALS
HEIGHT: 65 IN | WEIGHT: 167.55 LBS | SYSTOLIC BLOOD PRESSURE: 142 MMHG | DIASTOLIC BLOOD PRESSURE: 88 MMHG | RESPIRATION RATE: 16 BRPM | OXYGEN SATURATION: 99 % | HEART RATE: 66 BPM | BODY MASS INDEX: 27.92 KG/M2 | TEMPERATURE: 98.4 F

## 2025-04-16 DIAGNOSIS — G89.18 POST-OP PAIN: ICD-10-CM

## 2025-04-16 DIAGNOSIS — M20.41 HAMMER TOE OF RIGHT FOOT: Primary | ICD-10-CM

## 2025-04-16 PROCEDURE — 3700000001 HC GENERAL ANESTHESIA TIME - INITIAL BASE CHARGE: Performed by: PODIATRIST

## 2025-04-16 PROCEDURE — 7100000010 HC PHASE TWO TIME - EACH INCREMENTAL 1 MINUTE: Performed by: PODIATRIST

## 2025-04-16 PROCEDURE — 99100 ANES PT EXTEME AGE<1 YR&>70: CPT | Performed by: ANESTHESIOLOGY

## 2025-04-16 PROCEDURE — A28208 PR REPAIR EXTEN LEG TENDON,PRIM,EA

## 2025-04-16 PROCEDURE — 2500000004 HC RX 250 GENERAL PHARMACY W/ HCPCS (ALT 636 FOR OP/ED): Performed by: PODIATRIST

## 2025-04-16 PROCEDURE — 7100000009 HC PHASE TWO TIME - INITIAL BASE CHARGE: Performed by: PODIATRIST

## 2025-04-16 PROCEDURE — 2500000005 HC RX 250 GENERAL PHARMACY W/O HCPCS: Performed by: PODIATRIST

## 2025-04-16 PROCEDURE — A28208 PR REPAIR EXTEN LEG TENDON,PRIM,EA: Performed by: ANESTHESIOLOGY

## 2025-04-16 PROCEDURE — 3600000008 HC OR TIME - EACH INCREMENTAL 1 MINUTE - PROCEDURE LEVEL THREE: Performed by: PODIATRIST

## 2025-04-16 PROCEDURE — RXMED WILLOW AMBULATORY MEDICATION CHARGE

## 2025-04-16 PROCEDURE — 2500000004 HC RX 250 GENERAL PHARMACY W/ HCPCS (ALT 636 FOR OP/ED): Mod: JZ

## 2025-04-16 PROCEDURE — 28208 REPAIR OF FOOT TENDON: CPT | Performed by: PODIATRIST

## 2025-04-16 PROCEDURE — 3700000002 HC GENERAL ANESTHESIA TIME - EACH INCREMENTAL 1 MINUTE: Performed by: PODIATRIST

## 2025-04-16 PROCEDURE — 3600000003 HC OR TIME - INITIAL BASE CHARGE - PROCEDURE LEVEL THREE: Performed by: PODIATRIST

## 2025-04-16 PROCEDURE — 28820 AMPUTATION OF TOE: CPT | Performed by: PODIATRIST

## 2025-04-16 PROCEDURE — 2720000007 HC OR 272 NO HCPCS: Performed by: PODIATRIST

## 2025-04-16 RX ORDER — SCOPOLAMINE 1 MG/3D
1 PATCH, EXTENDED RELEASE TRANSDERMAL ONCE
Status: DISCONTINUED | OUTPATIENT
Start: 2025-04-16 | End: 2025-04-16 | Stop reason: HOSPADM

## 2025-04-16 RX ORDER — MORPHINE SULFATE 2 MG/ML
2 INJECTION, SOLUTION INTRAMUSCULAR; INTRAVENOUS EVERY 5 MIN PRN
Status: DISCONTINUED | OUTPATIENT
Start: 2025-04-16 | End: 2025-04-16 | Stop reason: HOSPADM

## 2025-04-16 RX ORDER — METOPROLOL TARTRATE 1 MG/ML
5 INJECTION, SOLUTION INTRAVENOUS ONCE
Status: DISCONTINUED | OUTPATIENT
Start: 2025-04-16 | End: 2025-04-16 | Stop reason: HOSPADM

## 2025-04-16 RX ORDER — LIDOCAINE HYDROCHLORIDE 20 MG/ML
INJECTION, SOLUTION INFILTRATION; PERINEURAL AS NEEDED
Status: DISCONTINUED | OUTPATIENT
Start: 2025-04-16 | End: 2025-04-16 | Stop reason: HOSPADM

## 2025-04-16 RX ORDER — FAMOTIDINE 10 MG/ML
20 INJECTION, SOLUTION INTRAVENOUS ONCE
Status: DISCONTINUED | OUTPATIENT
Start: 2025-04-16 | End: 2025-04-16 | Stop reason: HOSPADM

## 2025-04-16 RX ORDER — ALBUTEROL SULFATE 0.83 MG/ML
2.5 SOLUTION RESPIRATORY (INHALATION) ONCE AS NEEDED
Status: DISCONTINUED | OUTPATIENT
Start: 2025-04-16 | End: 2025-04-16 | Stop reason: HOSPADM

## 2025-04-16 RX ORDER — BUPIVACAINE HCL/EPINEPHRINE 0.5-1:200K
VIAL (ML) INJECTION AS NEEDED
Status: DISCONTINUED | OUTPATIENT
Start: 2025-04-16 | End: 2025-04-16 | Stop reason: HOSPADM

## 2025-04-16 RX ORDER — ONDANSETRON HYDROCHLORIDE 2 MG/ML
4 INJECTION, SOLUTION INTRAVENOUS ONCE AS NEEDED
Status: DISCONTINUED | OUTPATIENT
Start: 2025-04-16 | End: 2025-04-16 | Stop reason: HOSPADM

## 2025-04-16 RX ORDER — METOCLOPRAMIDE HYDROCHLORIDE 5 MG/ML
10 INJECTION INTRAMUSCULAR; INTRAVENOUS ONCE AS NEEDED
Status: DISCONTINUED | OUTPATIENT
Start: 2025-04-16 | End: 2025-04-16 | Stop reason: HOSPADM

## 2025-04-16 RX ORDER — ACETAMINOPHEN 325 MG/1
975 TABLET ORAL ONCE
Status: DISCONTINUED | OUTPATIENT
Start: 2025-04-16 | End: 2025-04-16 | Stop reason: HOSPADM

## 2025-04-16 RX ORDER — LABETALOL HYDROCHLORIDE 5 MG/ML
10 INJECTION, SOLUTION INTRAVENOUS ONCE AS NEEDED
Status: DISCONTINUED | OUTPATIENT
Start: 2025-04-16 | End: 2025-04-16 | Stop reason: HOSPADM

## 2025-04-16 RX ORDER — SODIUM CHLORIDE, SODIUM LACTATE, POTASSIUM CHLORIDE, CALCIUM CHLORIDE 600; 310; 30; 20 MG/100ML; MG/100ML; MG/100ML; MG/100ML
100 INJECTION, SOLUTION INTRAVENOUS CONTINUOUS
Status: DISCONTINUED | OUTPATIENT
Start: 2025-04-16 | End: 2025-04-16 | Stop reason: HOSPADM

## 2025-04-16 RX ORDER — BUPIVACAINE HYDROCHLORIDE 2.5 MG/ML
INJECTION, SOLUTION INFILTRATION; PERINEURAL AS NEEDED
Status: DISCONTINUED | OUTPATIENT
Start: 2025-04-16 | End: 2025-04-16 | Stop reason: HOSPADM

## 2025-04-16 RX ORDER — DIPHENHYDRAMINE HYDROCHLORIDE 50 MG/ML
25 INJECTION, SOLUTION INTRAMUSCULAR; INTRAVENOUS ONCE AS NEEDED
Status: DISCONTINUED | OUTPATIENT
Start: 2025-04-16 | End: 2025-04-16 | Stop reason: HOSPADM

## 2025-04-16 RX ORDER — HYDROCODONE BITARTRATE AND ACETAMINOPHEN 5; 325 MG/1; MG/1
1 TABLET ORAL EVERY 6 HOURS PRN
Qty: 5 TABLET | Refills: 0 | Status: SHIPPED | OUTPATIENT
Start: 2025-04-16

## 2025-04-16 RX ORDER — HYDRALAZINE HYDROCHLORIDE 20 MG/ML
10 INJECTION INTRAMUSCULAR; INTRAVENOUS EVERY 30 MIN PRN
Status: DISCONTINUED | OUTPATIENT
Start: 2025-04-16 | End: 2025-04-16 | Stop reason: HOSPADM

## 2025-04-16 RX ORDER — PROPOFOL 10 MG/ML
INJECTION, EMULSION INTRAVENOUS AS NEEDED
Status: DISCONTINUED | OUTPATIENT
Start: 2025-04-16 | End: 2025-04-16

## 2025-04-16 RX ORDER — LIDOCAINE HCL/PF 100 MG/5ML
SYRINGE (ML) INTRAVENOUS AS NEEDED
Status: DISCONTINUED | OUTPATIENT
Start: 2025-04-16 | End: 2025-04-16

## 2025-04-16 RX ORDER — HYDROMORPHONE HYDROCHLORIDE 1 MG/ML
1 INJECTION, SOLUTION INTRAMUSCULAR; INTRAVENOUS; SUBCUTANEOUS EVERY 5 MIN PRN
Status: DISCONTINUED | OUTPATIENT
Start: 2025-04-16 | End: 2025-04-16 | Stop reason: HOSPADM

## 2025-04-16 RX ORDER — CEFAZOLIN SODIUM 2 G/100ML
2 INJECTION, SOLUTION INTRAVENOUS ONCE
Status: COMPLETED | OUTPATIENT
Start: 2025-04-16 | End: 2025-04-16

## 2025-04-16 RX ORDER — ONDANSETRON HYDROCHLORIDE 2 MG/ML
INJECTION, SOLUTION INTRAVENOUS AS NEEDED
Status: DISCONTINUED | OUTPATIENT
Start: 2025-04-16 | End: 2025-04-16

## 2025-04-16 RX ORDER — MIDAZOLAM HYDROCHLORIDE 1 MG/ML
1 INJECTION, SOLUTION INTRAMUSCULAR; INTRAVENOUS ONCE AS NEEDED
Status: DISCONTINUED | OUTPATIENT
Start: 2025-04-16 | End: 2025-04-16 | Stop reason: HOSPADM

## 2025-04-16 RX ORDER — SODIUM CHLORIDE, SODIUM LACTATE, POTASSIUM CHLORIDE, CALCIUM CHLORIDE 600; 310; 30; 20 MG/100ML; MG/100ML; MG/100ML; MG/100ML
INJECTION, SOLUTION INTRAVENOUS CONTINUOUS PRN
Status: DISCONTINUED | OUTPATIENT
Start: 2025-04-16 | End: 2025-04-16

## 2025-04-16 RX ORDER — BUPIVACAINE HYDROCHLORIDE 5 MG/ML
INJECTION, SOLUTION PERINEURAL AS NEEDED
Status: DISCONTINUED | OUTPATIENT
Start: 2025-04-16 | End: 2025-04-16 | Stop reason: HOSPADM

## 2025-04-16 RX ORDER — FENTANYL CITRATE 50 UG/ML
INJECTION, SOLUTION INTRAMUSCULAR; INTRAVENOUS AS NEEDED
Status: DISCONTINUED | OUTPATIENT
Start: 2025-04-16 | End: 2025-04-16

## 2025-04-16 RX ORDER — MEPERIDINE HYDROCHLORIDE 50 MG/ML
12.5 INJECTION INTRAMUSCULAR; INTRAVENOUS; SUBCUTANEOUS EVERY 10 MIN PRN
Status: DISCONTINUED | OUTPATIENT
Start: 2025-04-16 | End: 2025-04-16 | Stop reason: HOSPADM

## 2025-04-16 RX ADMIN — CEFAZOLIN SODIUM 2 G: 2 INJECTION, SOLUTION INTRAVENOUS at 13:13

## 2025-04-16 RX ADMIN — PROPOFOL 110 MCG/KG/MIN: 10 INJECTION, EMULSION INTRAVENOUS at 13:12

## 2025-04-16 RX ADMIN — ONDANSETRON 4 MG: 2 INJECTION, SOLUTION INTRAMUSCULAR; INTRAVENOUS at 14:05

## 2025-04-16 RX ADMIN — PROPOFOL 20 MG: 10 INJECTION, EMULSION INTRAVENOUS at 13:13

## 2025-04-16 RX ADMIN — FENTANYL CITRATE 50 MCG: 50 INJECTION, SOLUTION INTRAMUSCULAR; INTRAVENOUS at 13:43

## 2025-04-16 RX ADMIN — SODIUM CHLORIDE, POTASSIUM CHLORIDE, SODIUM LACTATE AND CALCIUM CHLORIDE: 600; 310; 30; 20 INJECTION, SOLUTION INTRAVENOUS at 13:05

## 2025-04-16 RX ADMIN — LIDOCAINE HYDROCHLORIDE 60 MG: 20 INJECTION, SOLUTION INTRAVENOUS at 13:11

## 2025-04-16 RX ADMIN — DEXAMETHASONE SODIUM PHOSPHATE 4 MG: 4 INJECTION, SOLUTION INTRAMUSCULAR; INTRAVENOUS at 13:21

## 2025-04-16 RX ADMIN — FENTANYL CITRATE 50 MCG: 50 INJECTION, SOLUTION INTRAMUSCULAR; INTRAVENOUS at 13:11

## 2025-04-16 RX ADMIN — PROPOFOL 50 MG: 10 INJECTION, EMULSION INTRAVENOUS at 13:11

## 2025-04-16 SDOH — HEALTH STABILITY: MENTAL HEALTH: CURRENT SMOKER: 0

## 2025-04-16 ASSESSMENT — PAIN - FUNCTIONAL ASSESSMENT
PAIN_FUNCTIONAL_ASSESSMENT: 0-10

## 2025-04-16 ASSESSMENT — COLUMBIA-SUICIDE SEVERITY RATING SCALE - C-SSRS
1. IN THE PAST MONTH, HAVE YOU WISHED YOU WERE DEAD OR WISHED YOU COULD GO TO SLEEP AND NOT WAKE UP?: NO
2. HAVE YOU ACTUALLY HAD ANY THOUGHTS OF KILLING YOURSELF?: NO
6. HAVE YOU EVER DONE ANYTHING, STARTED TO DO ANYTHING, OR PREPARED TO DO ANYTHING TO END YOUR LIFE?: NO

## 2025-04-16 ASSESSMENT — PAIN SCALES - GENERAL
PAINLEVEL_OUTOF10: 0 - NO PAIN
PAIN_LEVEL: 0
PAINLEVEL_OUTOF10: 0 - NO PAIN

## 2025-04-16 NOTE — ANESTHESIA PREPROCEDURE EVALUATION
Patient: Sruthi Sanders    Procedure Information       Date/Time: 04/16/25 1300    Procedures:       RIGHT EXTENSOR HALLUCIS LONGUS TENDON  REPAIR (Right) - Extensor hallucis longus tendon      RIGHT 2nd TOE AMPUTATION (Right) - Second toe amputation right foot    Location: [facility] 07 / Virtual [facility]    Surgeons: Garrick Whitfield DPM            Relevant Problems   Anesthesia   (-) Difficult intubation   (-) PONV (postoperative nausea and vomiting)      Cardiac   (+) Abnormal EKG   (+) Benign essential hypertension   (+) Coronary atherosclerosis   (+) Hyperlipidemia      Neuro   (+) Generalized anxiety disorder      GI   (+) GERD (gastroesophageal reflux disease)      Musculoskeletal   (+) Osteoarthritis       Clinical information reviewed:   Tobacco  Allergies  Meds   Med Hx  Surg Hx  OB Status  Fam Hx  Soc   Hx        NPO Detail:  NPO/Void Status  Date of Last Liquid: 04/16/25  Time of Last Liquid: 0700 (black coffee)  Date of Last Solid: 04/15/25  Time of Last Solid: 2200         Physical Exam    Airway  Mallampati: I  TM distance: >3 FB  Neck ROM: full     Cardiovascular - normal exam  Rhythm: regular  Rate: normal     Dental    Pulmonary - normal exam   Abdominal            Anesthesia Plan    History of general anesthesia?: yes  History of complications of general anesthesia?: no    ASA 3     MAC     The patient is not a current smoker.  Education provided regarding risk of obstructive sleep apnea.  intravenous induction   Postoperative pain plan includes opioids.  Trial extubation is planned.  Anesthetic plan and risks discussed with patient.    Plan discussed with CRNA, CAA and attending.

## 2025-04-16 NOTE — ANESTHESIA POSTPROCEDURE EVALUATION
Patient: Sruthi Sanders    Procedure Summary       Date: 04/16/25 Room / Location: [facility] / [facility]    Anesthesia Start: 1305 Anesthesia Stop: 1420    Procedures:       RIGHT EXTENSOR HALLUCIS LONGUS TENDON  REPAIR (Right: Foot)      RIGHT 2nd TOE AMPUTATION (Right: Foot) Diagnosis:       Hammer toe of right foot      Hallux valgus, right      (Hammer toe of right foot [M20.41])      (Hallux valgus, right [M20.11])    Surgeons: Garrick Whitfield DPM Responsible Provider: Jose Mckeon MD    Anesthesia Type: MAC ASA Status: 3            Anesthesia Type: MAC    Vitals Value Taken Time   /76 04/16/25 14:17   Temp 36.9 04/16/25 14:20   Pulse 70 04/16/25 14:19   Resp 16 04/16/25 14:20   SpO2 97 % 04/16/25 14:19   Vitals shown include unfiled device data.    Anesthesia Post Evaluation    Patient location during evaluation: PACU  Patient participation: complete - patient participated  Level of consciousness: awake and alert  Pain score: 0  Pain management: satisfactory to patient  Airway patency: patent  Cardiovascular status: acceptable and hemodynamically stable  Respiratory status: acceptable, nonlabored ventilation, spontaneous ventilation and room air  Hydration status: acceptable  Postoperative Nausea and Vomiting: none        There were no known notable events for this encounter.

## 2025-04-16 NOTE — OP NOTE
RIGHT EXTENSOR HALLUCIS LONGUS TENDON  REPAIR (R), RIGHT 2nd TOE AMPUTATION (R) Operative Note     Date: 2025  OR Location: [facility]    Name: Sruthi Sanders, : 1950, Age: 74 y.o., MRN: [id number], Sex: female    Diagnosis  Pre-op Diagnosis      * Hammer toe of right foot [M20.41]     * Hallux valgus, right [M20.11] Post-op Diagnosis     * Hammer toe of right foot [M20.41]     * Hallux valgus, right [M20.11]     Procedures  RIGHT EXTENSOR HALLUCIS LONGUS TENDON  REPAIR  82493 - [place] REPAIR TENDON EXTENSOR FOOT 1/2 EACH TENDON    RIGHT 2nd TOE AMPUTATION  21955 - [place] AMPUTATION TOE METATARSOPHALANGEAL JOINT      Surgeons      * Garrick Whitfield - Primary    Resident/Fellow/Other Assistant:  Surgeons and Role:     * Shelton Duenas DPM - Resident - Assisting    Staff:   Circulator: Syl Lucas Person: Mi    Anesthesia Staff: Anesthesiologist: Jose Mckeon MD  CRNA: MARKELL Chowdhury-CRNA    Procedure Summary  Anesthesia: Monitor Anesthesia Care  ASA: III  Estimated Blood Loss: 10 mL  Intra-op Medications:   Administrations occurring from 1300 to 1500 on 25:   Medication Name Total Dose   lidocaine (Xylocaine) 20 mg/mL (2 %) injection 7.5 mL   BUPivacaine-EPINEPHrine (Marcaine w/EPI) 0.5 %-1:200,000 injection 6 mL   BUPivacaine HCl (Marcaine) 0.25 % (2.5 mg/mL) injection 7.5 mL   BUPivacaine HCl (Marcaine) 0.5 % (5 mg/mL) injection 4 mL   dexAMETHasone (Decadron) 4 mg/mL 4 mg   fentaNYL PF 0.05 mg/mL 100 mcg   LR infusion Cannot be calculated   lidocaine PF (cardiac) syringe 2% 60 mg   ondansetron 2 mg/mL 4 mg   propofol (Diprivan) infusion 10 mg/mL 499.78 mg   ceFAZolin (Ancef) 2 g in dextrose (iso)  mL 2 g              Anesthesia Record               Intraprocedure I/O Totals          Intake    Propofol Drip 0.00 mL    The total shown is the total volume documented since Anesthesia Start was filed.    LR infusion 800.00 mL    Total Intake 800 mL          Specimen: No specimens  collected              Drains and/or Catheters: * None in log *    Findings: Contracted extensor tendon right great toe joint with hallux malleus and subluxed/dislocated second digit right foot at the metatarsal phalangeal joint (rigid contracted hammertoe).    Indications: Sruthi Sanders is an 74 y.o. female who is having surgery for Hammer toe of right foot [M20.41]  Hallux valgus, right [M20.11].  She has had progressive pain and difficulty ambulating with conventional shoe gear because of above-mentioned digital deformities.  X-rays demonstrate findings consistent with the clinical findings including hallux valgus with hallux malleus and displacement of the second digit with hammertoe contracture.  The proposed procedure as well as alternatives were fully discussed with the patient.  She was in agreement with amputation of the digit and repair of the extensor tendon.  She was not interested in forefoot reconstruction otherwise.  Potential risks and complications were discussed including but not limited to delayed healing, infection, swelling, ongoing discomfort, the need for additional intervention.    The patient was seen in the preoperative area. The risks, benefits, complications, treatment options, non-operative alternatives, expected recovery and outcomes were discussed with the patient. The possibilities of reaction to medication, pulmonary aspiration, injury to surrounding structures, bleeding, recurrent infection, the need for additional procedures, failure to diagnose a condition, and creating a complication requiring transfusion or operation were discussed with the patient. The patient concurred with the proposed plan, giving informed consent.  The site of surgery was properly noted/marked if necessary per policy. The patient has been actively warmed in preoperative area. Preoperative antibiotics have been ordered and given within 1 hours of incision. Venous thrombosis prophylaxis have been ordered  including unilateral sequential compression device    Procedure Details: Patient transported the operating room placed on the OR table in the supine position.  After appropriate timeout by the surgical team IV sedation was administered.  The right foot was prepped draped and scrubbed in usual customary manner.    Attention was first directed to the second digit of the right foot.  Rigid contracture with displacement at the metatarsal phalangeal joint was identified.  2 semielliptical incisions were placed at the base of the digit in standard fashion.  The incision was deepened down to the level of metatarsal phalangeal joint the digit was disarticulated and removed from the field.  The area was irrigated.  Hemostasis was obtained using electrocautery and pressure.  No undue bleeding was appreciated.  Remaining tissue was viable.  Deep tissue was closed with 3-0 Vicryl and skin was closed with 4-0 nylon.    Attention was next directed to the contracted hallux.  A 2 cm linear incision was placed over the extensor hallucis longus tendon which noted to be both strength.  The incision was deepened down to the level of the tendon soft tissue was removed from the peritendinous area.  The tendon was exposed and the surgical site.  A Z-plasty lengthening was performed in standard fashion.  Adequate relaxation of the extensor tendon was appreciated.  It was repaired using 4-0 Prolene suture.  The area was irrigated.  Deep tissue was closed with 4-0 Vicryl and skin subsequently closed with 4-0 nylon.    Hemostasis was obtained prior to full closure.  Vascular status to the digits remain intact.  The area was irrigated.  Dry compressive sterile dressings were applied.  Transported PACU vital signs stable.  Complications:  None; patient tolerated the procedure well.    Disposition: PACU - hemodynamically stable.  Condition: stable     Attending Attestation: I was present and scrubbed for the entire procedure.    Garrick MADSEN  Roseann  Phone Number: [phone]    This dictation done with Dragon software and may contain grammatical errors or omissions that may have not been corrected.

## 2025-04-17 ASSESSMENT — PAIN SCALES - GENERAL: PAINLEVEL_OUTOF10: 1

## 2025-04-17 NOTE — DISCHARGE INSTRUCTIONS
PODIATRIC SURGERY POST-OP INSTRUCTIONS    YOU HAD ANESTHESIA:  You must have a responsible adult drive you home and stay with you for the first 24 hours.    Do not drive a car or drink any alcohol for 24 hours after surgery.  Do not do any strenuous work or activity for the next 24 hours.  You may have mild nausea, a sore throat or raspy voice for a few days.    You received a local numbing block to the foot after your surgery. You should expect the surgical extremity to remain numb for the next 6-12 hours.    POST-OP INSTRUCTIONS:  Keep dressing clean, dry and intact until your first post-operative appointment.  Do not remove surgical dressing. You may need to loosen if necessary.   Do not shower unless using a cast protector to protect dressing.  If dressing gets wet, please call office immediately as this can lead to increased risk of infection or wound dehiscence.   Elevate surgical extremity as much as possible to help with pain and swelling.  Place ice pack behind knee of surgical extremity (20 minutes on/20 minutes off while awake). After 24 hours use ice behind the knee as needed for comfort.  Weight Bearing Status: Bear weight as tolerated in surgical shoe.  Please resume all home medications.   Post-Operative Medications: You were prescribed Norco for pain; please take as directed on the label.  Post-operative appointment with Dr. Whitfield for post op follow up. Please call to schedule if not already scheduled.  Should any problems, questions, or concerns arise, please call the clinic office.    WHEN TO CALL YOUR DOCTOR:  Fever (>100.4°F or 38.0°C) or chills.  Incision problems such as redness, warmth, swelling, or foul smelling drainage.  Severe nausea or persistent vomiting.  Pain and swelling in your legs, especially if it is only on one side and not the other.  Pain with urination, cloudy urine, or foul smelling urine.  Or if you have any other problems or questions.  CALL 911 or go to the ED if you have  any shortness of breath, difficulty breathing, or chest pain.    n/a

## 2025-04-21 ENCOUNTER — APPOINTMENT (OUTPATIENT)
Dept: PRIMARY CARE | Facility: CLINIC | Age: 75
End: 2025-04-21
Payer: MEDICARE

## 2025-04-21 ENCOUNTER — OFFICE VISIT (OUTPATIENT)
Dept: PODIATRY | Facility: CLINIC | Age: 75
End: 2025-04-21
Payer: MEDICARE

## 2025-04-21 DIAGNOSIS — Z98.890 STATUS POST RIGHT FOOT SURGERY: ICD-10-CM

## 2025-04-21 DIAGNOSIS — M20.41 HAMMER TOE OF RIGHT FOOT: Primary | ICD-10-CM

## 2025-04-21 PROCEDURE — 99024 POSTOP FOLLOW-UP VISIT: CPT | Performed by: PODIATRIST

## 2025-04-21 PROCEDURE — 99211 OFF/OP EST MAY X REQ PHY/QHP: CPT | Performed by: PODIATRIST

## 2025-04-21 PROCEDURE — 1123F ACP DISCUSS/DSCN MKR DOCD: CPT | Performed by: PODIATRIST

## 2025-04-21 PROCEDURE — 1036F TOBACCO NON-USER: CPT | Performed by: PODIATRIST

## 2025-04-21 PROCEDURE — 1159F MED LIST DOCD IN RCRD: CPT | Performed by: PODIATRIST

## 2025-04-21 PROCEDURE — 99213 OFFICE O/P EST LOW 20 MIN: CPT | Performed by: PODIATRIST

## 2025-04-21 PROCEDURE — 1160F RVW MEDS BY RX/DR IN RCRD: CPT | Performed by: PODIATRIST

## 2025-04-21 NOTE — PROGRESS NOTES
Chief Complaint   Patient presents with    Follow-up     POST OP FOLLOW UP     Chief Complaint   Patient presents with    Follow-up     POST OP FOLLOW UP   Follow-up right foot surgery.  Doing very well.  Took a single pain pill not needing anymore.  Otherwise very pleased.  No constitutional symptoms.  No concerns otherwise.  Here with her daughter.          General/Constitutional: Alert. NAD.   Respiratory: Non labored breathing.   Psychiatric: Mood and affect normal/baseline.   HEENT: Sclera clear.   Surgical site dressing intact.  No strikethrough bleeding.  Small amount of dried blood on dressing as expected.  Primary wound healing.  No signs of infection.  Mild bruising As expected.  No erythema no ascending cellulitis minimal discomfort.  No bulla noted.      Impression: Status post surgery right foot progressing well April 16, 2025.      -Today's treatment and course of therapy was discussed with the patient in detail. Patient's questions were answered. Proper foot care was discussed. This dictation was done using Dragon computer software and as such may contain grammatical errors.    -Continue with instructions.  Limited activity.  Elevation and ice.  Dressing change performed well-tolerated.  I will see you back next week if any problems please let me know expect suture removal in 2 weeks.

## 2025-04-23 ENCOUNTER — APPOINTMENT (OUTPATIENT)
Dept: PODIATRY | Facility: CLINIC | Age: 75
End: 2025-04-23
Payer: MEDICARE

## 2025-04-29 ENCOUNTER — OFFICE VISIT (OUTPATIENT)
Dept: PODIATRY | Facility: CLINIC | Age: 75
End: 2025-04-29
Payer: MEDICARE

## 2025-04-29 DIAGNOSIS — Z98.890 STATUS POST RIGHT FOOT SURGERY: Primary | ICD-10-CM

## 2025-04-29 DIAGNOSIS — M79.671 FOOT PAIN, RIGHT: ICD-10-CM

## 2025-04-29 PROCEDURE — 1159F MED LIST DOCD IN RCRD: CPT | Performed by: PODIATRIST

## 2025-04-29 PROCEDURE — 99213 OFFICE O/P EST LOW 20 MIN: CPT | Performed by: PODIATRIST

## 2025-04-29 PROCEDURE — 1036F TOBACCO NON-USER: CPT | Performed by: PODIATRIST

## 2025-04-29 PROCEDURE — 1123F ACP DISCUSS/DSCN MKR DOCD: CPT | Performed by: PODIATRIST

## 2025-04-29 PROCEDURE — 1160F RVW MEDS BY RX/DR IN RCRD: CPT | Performed by: PODIATRIST

## 2025-04-29 NOTE — PROGRESS NOTES
Chief Complaint   Patient presents with    Follow-up     F/U FOOT SURGERY       Chief Complaint   Patient presents with    Follow-up     F/U FOOT SURGERY       Chief Complaint   Patient presents with    Follow-up     F/U FOOT SURGERY     Follow-up right foot surgery.  Doing very well.  Even her bunion pain went away.  Very pleased.  No constitutional symptoms.  No concerns otherwise.  Here with her daughter.          General/Constitutional: Alert. NAD.   Respiratory: Non labored breathing.   Psychiatric: Mood and affect normal/baseline.   HEENT: Sclera clear.   Surgical site dressing intact.  No bleeding at all.  Primary wound healing.  No signs of infection.  Mild bruising As expected.  No erythema no ascending cellulitis minimal discomfort.  No bulla noted.    Impression: Status post surgery right foot progressing well April 16, 2025.      -Today's treatment and course of therapy was discussed with the patient in detail. Patient's questions were answered. Proper foot care was discussed. This dictation was done using Dragon computer software and as such may contain grammatical errors.    -Continue with instructions.  Limited activity.  Elevation and ice.  Dressing change performed well-tolerated.  I will see you back next week if any problems please let me know expect suture removal next week.  And expect you to wear your own shoe at that point.  If your sandal is adjustable and feels comfortable you can wear now.

## 2025-05-13 ENCOUNTER — OFFICE VISIT (OUTPATIENT)
Dept: PODIATRY | Facility: CLINIC | Age: 75
End: 2025-05-13
Payer: MEDICARE

## 2025-05-13 DIAGNOSIS — Z98.890 STATUS POST RIGHT FOOT SURGERY: Primary | ICD-10-CM

## 2025-05-13 PROCEDURE — 1036F TOBACCO NON-USER: CPT | Performed by: PODIATRIST

## 2025-05-13 PROCEDURE — 1159F MED LIST DOCD IN RCRD: CPT | Performed by: PODIATRIST

## 2025-05-13 PROCEDURE — 1160F RVW MEDS BY RX/DR IN RCRD: CPT | Performed by: PODIATRIST

## 2025-05-13 PROCEDURE — 99211 OFF/OP EST MAY X REQ PHY/QHP: CPT | Performed by: PODIATRIST

## 2025-05-13 RX ORDER — AMOXICILLIN AND CLAVULANATE POTASSIUM 875; 125 MG/1; MG/1
TABLET, FILM COATED ORAL
COMMUNITY
Start: 2025-03-04

## 2025-05-13 NOTE — PROGRESS NOTES
Chief Complaint   Patient presents with    Follow-up     R FOOT STITCH REMOVEL     Chief Complaint   Patient presents with    Follow-up     R FOOT STITCH REMOVEL     Chief Complaint   Patient presents with    Follow-up     R FOOT STITCH REMOVEL     Chief Complaint   Patient presents with    Follow-up     R FOOT STITCH REMOVEL   Follow-up surgery right foot.  Doing very well.  No new complaints.          General/Constitutional: Alert. NAD.   Respiratory: Non labored breathing.   Psychiatric: Mood and affect normal/baseline.   HEENT: Sclera clear.   Surgical site dressing intact.  No bleeding at all.  Primary wound healing.  No signs of infection.  Ecchymosis resolved.  There is just mild swelling noted.  No erythema no ascending cellulitis minimal discomfort.  No bulla noted.    Impression: Status post surgery right foot progressing well April 16, 2025.      -Today's treatment and course of therapy was discussed with the patient in detail. Patient's questions were answered. Proper foot care was discussed. This dictation was done using Dragon computer software and as such may contain grammatical errors.    - Sutures both sites removed.    - Still recommend protective dressing over the areas for the next 1 to 2 weeks.  Until you are comfortable to go without it. Your compression stockings.  OK  to shower.  Recommend using your compression stocking as well.  Closed shoes when you are ready to    -Gradual increase in activity as tolerated.    - If there is any further problems or concerns please follow-up.

## 2025-06-17 DIAGNOSIS — F41.1 GENERALIZED ANXIETY DISORDER: ICD-10-CM

## 2025-06-17 RX ORDER — BUSPIRONE HYDROCHLORIDE 15 MG/1
15 TABLET ORAL EVERY 12 HOURS
Qty: 180 TABLET | Refills: 3 | Status: SHIPPED | OUTPATIENT
Start: 2025-06-17

## 2025-06-18 DIAGNOSIS — F51.01 PRIMARY INSOMNIA: ICD-10-CM

## 2025-06-19 RX ORDER — ZOLPIDEM TARTRATE 10 MG/1
10 TABLET ORAL NIGHTLY PRN
Qty: 90 TABLET | Refills: 0 | Status: SHIPPED | OUTPATIENT
Start: 2025-07-07

## 2025-06-24 ENCOUNTER — APPOINTMENT (OUTPATIENT)
Dept: PRIMARY CARE | Facility: CLINIC | Age: 75
End: 2025-06-24
Payer: MEDICARE

## 2025-06-24 VITALS
DIASTOLIC BLOOD PRESSURE: 72 MMHG | WEIGHT: 171.5 LBS | HEART RATE: 62 BPM | BODY MASS INDEX: 28.57 KG/M2 | SYSTOLIC BLOOD PRESSURE: 116 MMHG | OXYGEN SATURATION: 97 % | HEIGHT: 65 IN

## 2025-06-24 DIAGNOSIS — I10 BENIGN ESSENTIAL HYPERTENSION: Primary | ICD-10-CM

## 2025-06-24 DIAGNOSIS — F51.01 PRIMARY INSOMNIA: ICD-10-CM

## 2025-06-24 DIAGNOSIS — Z79.899 MEDICATION MANAGEMENT: ICD-10-CM

## 2025-06-24 DIAGNOSIS — F41.1 GENERALIZED ANXIETY DISORDER: ICD-10-CM

## 2025-06-24 DIAGNOSIS — E78.2 MIXED HYPERLIPIDEMIA: ICD-10-CM

## 2025-06-24 PROCEDURE — G2211 COMPLEX E/M VISIT ADD ON: HCPCS | Performed by: INTERNAL MEDICINE

## 2025-06-24 PROCEDURE — 3074F SYST BP LT 130 MM HG: CPT | Performed by: INTERNAL MEDICINE

## 2025-06-24 PROCEDURE — 3078F DIAST BP <80 MM HG: CPT | Performed by: INTERNAL MEDICINE

## 2025-06-24 PROCEDURE — 1036F TOBACCO NON-USER: CPT | Performed by: INTERNAL MEDICINE

## 2025-06-24 PROCEDURE — 1159F MED LIST DOCD IN RCRD: CPT | Performed by: INTERNAL MEDICINE

## 2025-06-24 PROCEDURE — 99214 OFFICE O/P EST MOD 30 MIN: CPT | Performed by: INTERNAL MEDICINE

## 2025-06-24 PROCEDURE — 1160F RVW MEDS BY RX/DR IN RCRD: CPT | Performed by: INTERNAL MEDICINE

## 2025-06-24 NOTE — PROGRESS NOTES
"Subjective   Patient ID: Qing Sanders is a 74 y.o. female who presents for Follow-up (Blood pressure check. ).    HPI     Had toe surgery.getting better   Taking  blood pressure medications.  Denies headache, dizziness, chest pain, shortness of breath or palpitation.  No exertional chest pain or dizziness or palpitation.  Has had eye exam within 1 year.  No blurred vision or double vision  Following DASH diet     Needs ambien some days to sleep.tolerated  Anxiety controlled on buspar.    Tolerates statin.  Watching diet    Review of Systems  GENERAL;Denies weight changes,fatigue,fever,chills or sweats  HEENT:Denies headache,sorethroat,sinus drainage ,vision or hearing issues  CVS:No chest pain,sob or palpitation.No leg swelling  RESP:No c/c cough,cp,sob or wheezing  GI:NO abdominal pain,nausea,heartburn,vomiting,or bowel changes.  :No painful urination,frequency or hematuria.No incontinence  MSK:No joint pain or swelling  NEURO:No dizziness,weakness,numbness or tingling.No memory issues  PSYCH:No anxiety,depression or sleep issues while taking meds  Objective   /72   Pulse 62   Ht 1.651 m (5' 5\")   Wt 77.8 kg (171 lb 8 oz)   SpO2 97%   BMI 28.54 kg/m²     Physical Exam  In general, well-appearing, not in acute distress, alert and oriented.  HEENT: No pallor or icterus  Neck: No lymphadenopathy, no stiffness.  Supple.  .No JVD  Chest: Clear to auscultation, good air entry.  CVS: S1 and S2 regular.  No murmur, no gallop, S3 or S4.  Trace right side  peripheral edema.  No carotid bruit.  Abdomen: Soft, no tenderness, no hepatosplenomegaly.  Extremities: No calf tenderness.  No peripheral edema.    Assessment/Plan   Problem List Items Addressed This Visit           ICD-10-CM    Benign essential hypertension - Primary I10    Relevant Orders    CBC and Auto Differential    Comprehensive Metabolic Panel    Lipid Panel    Generalized anxiety disorder F41.1    Hyperlipidemia E78.5    Relevant Orders    " Comprehensive Metabolic Panel    Lipid Panel    Insomnia G47.00     Other Visit Diagnoses         Codes      Medication management     Z79.899    Relevant Orders    Drug Screen, Urine With Reflex to Confirmation          Blood pressure is under control.  Continue same treatment  Will check labs before next appointment    Anxiety controlled with current treatment.  Continue same plan    Had foot surgery.  Recovering.  Has slight leg swelling.  No signs of DVT  Elevate legs    Use Ambien as needed  On PPI 3 times a week.  Can try to stop    Continue statin and check labs in October  Follow-up for wellness exam in October  Get RSV vaccine.

## 2025-06-25 LAB
AMPHETAMINES UR QL: NEGATIVE NG/ML
BARBITURATES UR QL: NEGATIVE NG/ML
BENZODIAZ UR QL: NEGATIVE NG/ML
BZE UR QL: NEGATIVE NG/ML
CREAT UR-MCNC: 219.2 MG/DL
FENTANYL UR QL SCN: NEGATIVE NG/ML
METHADONE UR QL: NEGATIVE NG/ML
OPIATES UR QL: NEGATIVE NG/ML
OXIDANTS UR QL: NEGATIVE MCG/ML
OXYCODONE UR QL: NEGATIVE NG/ML
PCP UR QL: NEGATIVE NG/ML
PH UR: 5.9 [PH] (ref 4.5–9)
QUEST NOTES AND COMMENTS: NORMAL
THC UR QL: NEGATIVE NG/ML

## 2025-07-30 DIAGNOSIS — K21.9 GASTROESOPHAGEAL REFLUX DISEASE WITHOUT ESOPHAGITIS: ICD-10-CM

## 2025-07-30 RX ORDER — PANTOPRAZOLE SODIUM 40 MG/1
TABLET, DELAYED RELEASE ORAL
Qty: 36 TABLET | Refills: 3 | Status: SHIPPED | OUTPATIENT
Start: 2025-07-30

## 2025-09-02 ENCOUNTER — HOSPITAL ENCOUNTER (OUTPATIENT)
Dept: RADIOLOGY | Facility: CLINIC | Age: 75
Discharge: HOME | End: 2025-09-02
Payer: MEDICARE

## 2025-09-02 DIAGNOSIS — Z12.31 ENCOUNTER FOR SCREENING MAMMOGRAM FOR BREAST CANCER: ICD-10-CM

## 2025-09-02 PROCEDURE — 77063 BREAST TOMOSYNTHESIS BI: CPT

## 2025-09-02 PROCEDURE — 77063 BREAST TOMOSYNTHESIS BI: CPT | Performed by: RADIOLOGY

## 2025-09-02 PROCEDURE — 77067 SCR MAMMO BI INCL CAD: CPT | Performed by: RADIOLOGY

## 2025-09-05 ENCOUNTER — HOSPITAL ENCOUNTER (OUTPATIENT)
Dept: RADIOLOGY | Facility: EXTERNAL LOCATION | Age: 75
Discharge: HOME | End: 2025-09-05

## 2025-10-27 ENCOUNTER — APPOINTMENT (OUTPATIENT)
Dept: PRIMARY CARE | Facility: CLINIC | Age: 75
End: 2025-10-27
Payer: MEDICARE

## 2025-11-10 ENCOUNTER — APPOINTMENT (OUTPATIENT)
Dept: PRIMARY CARE | Facility: CLINIC | Age: 75
End: 2025-11-10
Payer: MEDICARE

## (undated) DEVICE — SYRINGE, 10 CC, LUER LOCK

## (undated) DEVICE — BANDAGE, ELASTIC, PREMIUM, SELF-CLOSE, 4 IN X 5.5 YD, STERILE

## (undated) DEVICE — Device

## (undated) DEVICE — DRESSING, MAXORB II, 4 X 4IN, ALIGINATE